# Patient Record
Sex: MALE | Race: WHITE | NOT HISPANIC OR LATINO | Employment: FULL TIME | URBAN - METROPOLITAN AREA
[De-identification: names, ages, dates, MRNs, and addresses within clinical notes are randomized per-mention and may not be internally consistent; named-entity substitution may affect disease eponyms.]

---

## 2023-07-27 ENCOUNTER — OFFICE VISIT (OUTPATIENT)
Dept: URGENT CARE | Facility: URGENT CARE | Age: 38
End: 2023-07-27
Payer: COMMERCIAL

## 2023-07-27 VITALS
WEIGHT: 215 LBS | RESPIRATION RATE: 20 BRPM | HEART RATE: 117 BPM | DIASTOLIC BLOOD PRESSURE: 88 MMHG | TEMPERATURE: 100.9 F | OXYGEN SATURATION: 98 % | SYSTOLIC BLOOD PRESSURE: 133 MMHG

## 2023-07-27 DIAGNOSIS — R50.9 FEVER IN ADULT: ICD-10-CM

## 2023-07-27 DIAGNOSIS — Z20.822 SUSPECTED 2019 NOVEL CORONAVIRUS INFECTION: Primary | ICD-10-CM

## 2023-07-27 DIAGNOSIS — R11.2 NAUSEA AND VOMITING, UNSPECIFIED VOMITING TYPE: ICD-10-CM

## 2023-07-27 LAB — DEPRECATED S PYO AG THROAT QL EIA: NEGATIVE

## 2023-07-27 PROCEDURE — 99203 OFFICE O/P NEW LOW 30 MIN: CPT | Performed by: NURSE PRACTITIONER

## 2023-07-27 PROCEDURE — 87651 STREP A DNA AMP PROBE: CPT | Performed by: NURSE PRACTITIONER

## 2023-07-27 PROCEDURE — 87635 SARS-COV-2 COVID-19 AMP PRB: CPT | Performed by: NURSE PRACTITIONER

## 2023-07-27 RX ORDER — ONDANSETRON 4 MG/1
4 TABLET, ORALLY DISINTEGRATING ORAL EVERY 8 HOURS PRN
Qty: 21 TABLET | Refills: 0 | Status: SHIPPED | OUTPATIENT
Start: 2023-07-27 | End: 2023-08-03

## 2023-07-27 RX ORDER — ONDANSETRON 4 MG/1
4 TABLET, ORALLY DISINTEGRATING ORAL ONCE
Status: COMPLETED | OUTPATIENT
Start: 2023-07-27 | End: 2023-07-27

## 2023-07-27 RX ORDER — AMLODIPINE BESYLATE 5 MG/1
TABLET ORAL
COMMUNITY
Start: 2023-04-14

## 2023-07-27 RX ORDER — IBUPROFEN 400 MG/1
400 TABLET, FILM COATED ORAL EVERY 6 HOURS PRN
COMMUNITY
End: 2023-08-04

## 2023-07-27 RX ADMIN — ONDANSETRON 4 MG: 4 TABLET, ORALLY DISINTEGRATING ORAL at 19:39

## 2023-07-28 LAB
GROUP A STREP BY PCR: NOT DETECTED
SARS-COV-2 RNA RESP QL NAA+PROBE: NEGATIVE

## 2023-07-28 NOTE — PATIENT INSTRUCTIONS
You may want to stick with Tylenol for fever control as ibuprofen is hard on her stomach.      Vomiting and diarrhea can have many causes, including:  Helping your body get rid of harmful substances   Gastroenteritis caused by viruses, parasites, bacteria, or toxins.  Allergy to or side effect of a food or medicine  Severe stress or worry (anxiety)   Other illnesses  Pregnancy  It is often hard to pinpoint an exact cause, even with testing. Vomiting and diarrhea often go away within a day or two without problems. If they continue, though, they can lead to too much loss of fluid (dehydration). This can be serious if not treated.    Medicines  You may use acetaminophen or NSAID medicines like ibuprofen or naproxen to control fever, unless another medicine was prescribed. If you have chronic liver or kidney disease, talk with your healthcare provider before using these medicines. Also talk with your provider if you've had a stomach ulcer or gastrointestinal bleeding. Don't give aspirin to anyone under 18 years of age who is ill with a fever because it may cause severe disease or death. Don't use NSAID medicines if you are already taking one for another condition (like arthritis) or are on aspirin (such as for heart disease or after a stroke)  Over-the-counter medicines for diarrhea, nausea, and vomiting are generally OK unless you have bleeding, fever, or severe abdominal pain.  General care  If symptoms are severe, rest at home for the next 24 hours, or until you are feeling better.  Washing your hands with soap and water, or using alcohol-based hand  is the best way to stop the spread of infection. Wash your hands after touching anyone who is sick.  Wash your hands after using the toilet and before meals. Clean the toilet after each use.  Dry your hands with a single use towel.  Caffeine, tobacco, and alcohol can make the diarrhea, cramping, and pain worse. Remember, caffeine not only is in coffee, but also  is in chocolate, some energy drinks, and teas.  Diet  Water and clear liquids are important so you don't get dehydrated. Drink a small amount at a time. Don't guzzle down the drinks. That may increase your nausea, make cramping worse, and cause the drinks to come back up.  Sports drinks may also help if you are healthy and not too dehydrated. They have too much sugar and not enough electrolytes and can sometimes make things worse. Also, don't drink beverages that are too acidic, like orange juice and grape juice.  If you are very dehydrated, commercially available products called oral rehydration solutions are best.  Food  Don't force yourself to eat, especially if you have cramps, diarrhea, or vomiting. Eat just a little at a time, and then wait a few minutes before you try to eat more.  Don't eat fatty, greasy, spicy, or fried foods.  Don't eat dairy products if you have diarrhea. They can make it worse.  During the first 24 hours (the first full day), follow the diet below:  Beverages: Oral rehydration solutions, sports drinks, soft drinks without caffeine, mineral water, and decaffeinated tea and coffee  Soups: Clear broth, consommé, and bouillon  Desserts: Plain gelatin, popsicles, and fruit juice bars  During the next 24 hours (the second day), you may add the following to the above if you are better. If not, continue what you did the first day:  Hot cereal, plain toast, bread, rolls, crackers  Plain noodles, rice, mashed potatoes, chicken noodle or rice soup  Unsweetened canned fruit (avoid pineapple), bananas  Limit fat intake to less than 15 grams per day by avoiding margarine, butter, oils, mayonnaise, sauces, gravies, fried foods, peanut butter, meat, poultry, and fish.  Limit fiber. Avoid raw or cooked vegetables, fresh fruits (except bananas) and bran cereals.  Limit caffeine and chocolate. No spices or seasonings except salt.  During the next 24 hours:  Gradually resume a normal diet, as you feel  better and your symptoms improve.  If at any time your symptoms start getting worse again, go back to clear liquids until you feel better.  Food preparation  If you have diarrhea, you should not prepare food for others. When preparing foods, wash your hands before and after.  Wash your hands or use alcohol-based  after using cutting boards, countertops, and knives that have been in contact with raw food.  Dry your hands with a single use towel.  Keep uncooked meats away from cooked and ready-to-eat foods.    Follow-up care  Follow up with your healthcare provider, or as advised. Call if you don't get better in the next 2 to 3 days. If a stool (diarrhea) sample was taken, or cultures done, you will be told if they are positive, or if your treatment needs to be changed. You may call as directed for the results.  If X-rays were taken, you will be notified of any new findings that may affect your care  Call 911  Call 911 if any of these occur:  Trouble breathing  Chest pain  Confusion  Severe drowsiness or trouble awakening  Fainting or loss of consciousness  Rapid heart rate  Seizure  Stiff neck  Severe weakness, dizziness, or lightheadedness  When to seek medical advice  Call your healthcare provider right away if any of these occur:  Bloody or black vomit or stools  Severe, steady abdominal pain or any abdominal pain that is getting worse  Severe headache or stiff neck  An inability to hold down even sips of liquids for more than 12 hours  Vomiting that lasts more than 24 hours  Diarrhea that lasts more than 24 hours  Fever of 100.4 F (38.0 C) or higher, or as directed by your healthcare provider  Yellowish color to your skin or the whites of your eyes  Signs of dehydration, such as dry mouth, little urine (less than every 6 hours), or very dark urine

## 2023-07-28 NOTE — PROGRESS NOTES
Chief Complaint   Patient presents with    Urgent Care     Chills and mild fever 100.9 for the last 5 hours, son was diagnose with HFM         ICD-10-CM    1. Suspected 2019 novel coronavirus infection  Z20.822 Symptomatic COVID-19 Virus (Coronavirus) by PCR Nasopharyngeal      2. Nausea and vomiting, unspecified vomiting type  R11.2 ondansetron (ZOFRAN ODT) ODT tab 4 mg     Streptococcus A Rapid Screen w/Reflex to PCR - Clinic Collect     Group A Streptococcus PCR Throat Swab     ondansetron (ZOFRAN ODT) 4 MG ODT tab      3. Fever in adult  R50.9 Streptococcus A Rapid Screen w/Reflex to PCR - Clinic Collect     Group A Streptococcus PCR Throat Swab      Ondansetron, Tylenol for fever.  Likely hand-foot-and-mouth from exposure by child.  If he develops other concerning symptoms he is instructed to return for recheck.      Results for orders placed or performed in visit on 07/27/23 (from the past 24 hour(s))   Streptococcus A Rapid Screen w/Reflex to PCR - Clinic Collect    Specimen: Throat; Swab   Result Value Ref Range    Group A Strep antigen Negative Negative       Subjective     Ry Puckett is an 37 year old male who presents to clinic today for fever , chills, nausea and vomiting, slight sore throat.     Ibuprofen at 1845.    ROS: 10 point ROS neg other than the symptoms noted above in the HPI.       Objective    /88   Pulse 117   Temp (!) 100.9  F (38.3  C)   Resp 20   Wt 97.5 kg (215 lb)   SpO2 98%   Nurses notes and VS have been reviewed.    Physical Exam       GENERAL APPEARANCE: alert and moderate distress, actively vomiting     EYES: PERRL, EOMI, sclera non-icteric     HENT: ear canals and TM's normal and multiple small ulcerations in the back of throat and on hard palate viewing into the buccal surfaces, surrounded by red bases     NECK: no adenopathy or asymmetry, thyroid normal to palpation     RESP: lungs clear to auscultation - no rales, rhonchi or wheezes     CV: regular rates and  rhythm, no murmurs, rubs, or gallop     ABDOMEN:  soft, nontender, no HSM or masses and bowel sounds normal     MS: extremities normal- no gross deformities noted; normal muscle tone.     SKIN: no suspicious lesions or rashes     NEURO: Normal strength and tone, mentation intact and speech normal     PSYCH: normal thought process; no significant mood disturbance      YONG Wilkerson, CNP  [facility] Provider    The use of Dragon/[employer]ware[employer] dictation services may have been used to construct the content in this note; any grammatical or spelling errors are non-intentional. Please contact the author of this note directly if you are in need of any clarification.

## 2023-08-04 ENCOUNTER — VIRTUAL VISIT (OUTPATIENT)
Dept: UROLOGY | Facility: CLINIC | Age: 38
End: 2023-08-04
Attending: UROLOGY
Payer: COMMERCIAL

## 2023-08-04 DIAGNOSIS — Z30.09 VASECTOMY EVALUATION: Primary | ICD-10-CM

## 2023-08-04 PROCEDURE — 99203 OFFICE O/P NEW LOW 30 MIN: CPT | Mod: VID | Performed by: UROLOGY

## 2023-08-04 NOTE — PROGRESS NOTES
Orlando is a 37 year old who is being evaluated via a billable video visit.      How would you like to obtain your AVS? MyChart  If the video visit is dropped, the invitation should be resent by: Text to cell phone: [phone]  Will anyone else be joining your video visit? No              Subjective   Orlando is a 37 year old, presenting for the following health issues:  Video Visit    HPI     Patient is interested in vasectomy.  He has 3 kids.      Review of Systems   Constitutional, HEENT, cardiovascular, pulmonary, gi and gu systems are negative, except as otherwise noted.      Objective           Vitals:  No vitals were obtained today due to virtual visit.    Physical Exam   GENERAL: Healthy, alert and no distress  EYES: Eyes grossly normal to inspection.  No discharge or erythema, or obvious scleral/conjunctival abnormalities.  RESP: No audible wheeze, cough, or visible cyanosis.  No visible retractions or increased work of breathing.    SKIN: Visible skin clear. No significant rash, abnormal pigmentation or lesions.  NEURO: Cranial nerves grossly intact.  Mentation and speech appropriate for age.  PSYCH: Mentation appears normal, affect normal/bright, judgement and insight intact, normal speech and appearance well-groomed.        Discussed    That vasectomy is permanent method of birth control.    That vasectomy can fail due to recanalization of the vas even many months/years later.    That he needs 2 negative sperm checks to be considered sterile    That there are other methods that are not permanent and also that the sperm can be frozen for later use.    How the technique is performed, risks of infection, bleeding, damage to the testes vessels and testes atrophy    Long term complication such as chronic and difficult to treat testes pain and questionable increase incidence of prostate cancer    That the procedure can be done at the clinic or hospital OR        Plan:    Stop Aspirin  Will schedule Vasectomy in the  future        Video-Visit Details    Type of service:  Video Visit     Originating Location (pt. Location): Home    Distant Location (provider location):  On-site  Platform used for Video Visit: [employer]

## 2023-08-08 ENCOUNTER — TELEPHONE (OUTPATIENT)
Dept: UROLOGY | Facility: CLINIC | Age: 38
End: 2023-08-08
Payer: COMMERCIAL

## 2023-08-08 NOTE — TELEPHONE ENCOUNTER
Patient calling office to schedule vasectomy.  Appointment scheduled 9/25/23. Patient verbally instructed.  Teodora Glover CMA

## 2023-08-13 ENCOUNTER — HEALTH MAINTENANCE LETTER (OUTPATIENT)
Age: 38
End: 2023-08-13

## 2023-09-15 ENCOUNTER — TELEPHONE (OUTPATIENT)
Dept: UROLOGY | Facility: CLINIC | Age: 38
End: 2023-09-15
Payer: COMMERCIAL

## 2023-09-15 NOTE — TELEPHONE ENCOUNTER
[facility]    Phone Message    May a detailed message be left on voicemail: no     Reason for Call: Other: Patient called to cancel her appointment on 9/25 for a vasectomy due to provider and clinic not being in network any more. Please call patient if there is any follow up.     Action Taken: Other: [facility]    Travel Screening: Not Applicable

## 2023-11-13 ENCOUNTER — VIRTUAL VISIT (OUTPATIENT)
Dept: FAMILY MEDICINE | Facility: CLINIC | Age: 38
End: 2023-11-13
Payer: COMMERCIAL

## 2023-11-13 DIAGNOSIS — J06.9 UPPER RESPIRATORY TRACT INFECTION, UNSPECIFIED TYPE: Primary | ICD-10-CM

## 2023-11-13 PROCEDURE — 99213 OFFICE O/P EST LOW 20 MIN: CPT | Mod: 95 | Performed by: FAMILY MEDICINE

## 2023-11-13 NOTE — PROGRESS NOTES
Orlando is a 37 year old who is being evaluated via a billable video visit.      How would you like to obtain your AVS? MyChart  If the video visit is dropped, the invitation should be resent by: Text to cell phone: [phone]  Will anyone else be joining your video visit? No        Assessment & Plan     Upper respiratory tract infection, unspecified type  10 day hx of uri with chest congestion/yellow green sputum. Today feeling better. Rec mucinex 600-1200 mg bid, fluids, rest. Cxr if persistent/worsening sx's. He is traveling to [place] today and can also mychart if sx's slightly worsen when he is there for abx. However, if sx's worsen significantly should be seen in person.   - XR Chest 2 Views; Future             Noreen Thorpe MD  [facility]   Orlando is a 37 year old, presenting for the following health issues:  Cough        11/13/2023     7:17 AM   Additional Questions   Roomed by jay   Accompanied by self       History of Present Illness       Reason for visit:  I think i have a respiratory infection a week after developing a cough. I sometimes cough up yellow/green mucus a week after developing a bad cough.  Symptom onset:  1-2 weeks ago  Symptoms include:  Cough from my chest, yellow/mucus coming up.  Symptom intensity:  Moderate  Symptom progression:  Worsening  Had these symptoms before:  No  What makes it worse:  Bending over (lowering my head) triggers a coughing fit    He eats 2-3 servings of fruits and vegetables daily.He consumes 0 sweetened beverage(s) daily.He exercises with enough effort to increase his heart rate 9 or less minutes per day.  He exercises with enough effort to increase his heart rate 3 or less days per week. He is missing 1 dose(s) of medications per week.  He is not taking prescribed medications regularly due to remembering to take.    Pt say he has had a cough deep in his chest for a week. It s progressed from a dry, itchy cough  starting near the base of wind pipe into to a wetter cough deeper in his chest / closer to his lungs. Sometimes he is coughing up yellow/green mucus      Pt says it came without any cold symptoms 10 days ago.     Sxstarted with dry itchy tickle deep in throat/neck area. Cough on exhale  Covid test was neg  Wife and kids all sick    Thurs or Friday last week started to transition to wet cough originating from sternum area.   Worse over weekend  Felt little better today.     Occ yellow green mucous  No fever,   General fatigue.   Mild congestion like normal     Ibuprofen last week, cough drops, zinc tabs    Functioning okay  Eating well.     No dyspnea/chest pain.   Today able to take deep breaths and thinks sleeping upright helps.     No hx of asthma or needed inhaler.         Review of Systems         Objective           Vitals:  No vitals were obtained today due to virtual visit.    Physical Exam   GENERAL: Healthy, alert and no distress  EYES: Eyes grossly normal to inspection.  No discharge or erythema, or obvious scleral/conjunctival abnormalities.  RESP: No audible wheeze, or visible cyanosis.  Rare cough. No visible retractions or increased work of breathing.    SKIN: Visible skin clear. No significant rash, abnormal pigmentation or lesions.  NEURO: Cranial nerves grossly intact.  Mentation and speech appropriate for age.  PSYCH: Mentation appears normal, affect normal/bright, judgement and insight intact, normal speech and appearance well-groomed.                Video-Visit Details    Type of service:  Video Visit     Originating Location (pt. Location): Home    Distant Location (provider location):  Off-site  Platform used for Video Visit: [employer]

## 2023-11-13 NOTE — PATIENT INSTRUCTIONS
Mucinex 600-1200 mg twice daily as needed for congestion.     Be seen in person if difficulty breathing, new fever, etc.

## 2023-11-17 ENCOUNTER — MYC MEDICAL ADVICE (OUTPATIENT)
Dept: FAMILY MEDICINE | Facility: CLINIC | Age: 38
End: 2023-11-17

## 2023-11-17 ENCOUNTER — ANCILLARY PROCEDURE (OUTPATIENT)
Dept: GENERAL RADIOLOGY | Facility: CLINIC | Age: 38
End: 2023-11-17
Attending: FAMILY MEDICINE
Payer: COMMERCIAL

## 2023-11-17 DIAGNOSIS — J06.9 UPPER RESPIRATORY TRACT INFECTION, UNSPECIFIED TYPE: Primary | ICD-10-CM

## 2023-11-17 DIAGNOSIS — J06.9 UPPER RESPIRATORY TRACT INFECTION, UNSPECIFIED TYPE: ICD-10-CM

## 2023-11-17 DIAGNOSIS — R05.9 COUGH, UNSPECIFIED TYPE: ICD-10-CM

## 2023-11-17 PROCEDURE — 71046 X-RAY EXAM CHEST 2 VIEWS: CPT | Mod: TC | Performed by: RADIOLOGY

## 2023-11-17 RX ORDER — ALBUTEROL SULFATE 90 UG/1
2 AEROSOL, METERED RESPIRATORY (INHALATION) EVERY 4 HOURS PRN
Qty: 18 G | Refills: 0 | Status: SHIPPED | OUTPATIENT
Start: 2023-11-17 | End: 2024-07-16

## 2023-11-17 RX ORDER — BENZONATATE 100 MG/1
100 CAPSULE ORAL 3 TIMES DAILY PRN
Qty: 30 CAPSULE | Refills: 0 | Status: SHIPPED | OUTPATIENT
Start: 2023-11-17 | End: 2024-07-16

## 2023-11-17 NOTE — RESULT ENCOUNTER NOTE
Orlando,  Your x-ray was reviewed by radiology.  No pneumonia or other abnormalities were seen.  Please continue with the plan per my message.  Kind regards,  Noreen Thorpe MD

## 2023-11-27 ENCOUNTER — OFFICE VISIT (OUTPATIENT)
Dept: FAMILY MEDICINE | Facility: CLINIC | Age: 38
End: 2023-11-27
Payer: COMMERCIAL

## 2023-11-27 VITALS
HEART RATE: 80 BPM | HEIGHT: 73 IN | TEMPERATURE: 97.2 F | DIASTOLIC BLOOD PRESSURE: 96 MMHG | WEIGHT: 207.25 LBS | OXYGEN SATURATION: 100 % | BODY MASS INDEX: 27.47 KG/M2 | SYSTOLIC BLOOD PRESSURE: 148 MMHG

## 2023-11-27 DIAGNOSIS — I10 PRIMARY HYPERTENSION: ICD-10-CM

## 2023-11-27 DIAGNOSIS — F43.23 ADJUSTMENT DISORDER WITH MIXED ANXIETY AND DEPRESSED MOOD: ICD-10-CM

## 2023-11-27 DIAGNOSIS — R05.9 COUGH, UNSPECIFIED TYPE: Primary | ICD-10-CM

## 2023-11-27 RX ORDER — PREDNISONE 20 MG/1
40 TABLET ORAL DAILY
Qty: 10 TABLET | Refills: 0 | Status: SHIPPED | OUTPATIENT
Start: 2023-11-27 | End: 2024-07-16

## 2023-11-27 RX ORDER — CODEINE PHOSPHATE AND GUAIFENESIN 10; 100 MG/5ML; MG/5ML
1-2 SOLUTION ORAL EVERY 4 HOURS PRN
Qty: 180 ML | Refills: 0 | Status: SHIPPED | OUTPATIENT
Start: 2023-11-27 | End: 2024-07-16

## 2023-11-27 NOTE — Clinical Note
Arnoldo Paz, Met with Orlando today. Really nice tawnya with a lot on his plate. I had suggested meeting with you before and he is now interested. Can you give him a call, please? Let me know if you have questions. Thanks, Bernabe

## 2023-11-27 NOTE — PROGRESS NOTES
"  Assessment & Plan   Problem List Items Addressed This Visit          Circulatory    Primary hypertension     Other Visit Diagnoses       Cough, unspecified type    -  Primary    Relevant Medications    predniSONE (DELTASONE) 20 MG tablet    guaiFENesin-codeine (ROBITUSSIN AC) 100-10 MG/5ML solution    Adjustment disorder with mixed anxiety and depressed mood              Suspect Orlando is no longer infectious at this point, but dealing with persistent post infectious inflammation. Rxs as noted below to help with sleep and reduce inflammation. Encouraged Orlando to contact me with any questions.     No plans to change BP meds at this point. Encouraged Orlando to check pressures at home intermittently when he has a chance to relax. Could consider med change if home BPs remain elevated.    Will forward chart to clinic S to contact Orlando at Jackson's convenience. Briefly touched on medication options but Orlando defers this today.      32 minutes spent on the date of the encounter doing chart review, history and exam, documentation and further activities as noted.      Bernabe Moore MD  [facility]    Subjective   Orlando is a 37 year old, presenting for the following health issues:  Cold Symptoms (Has done a number of virtual visits for a chest infection prior to this appointment. 2nd round of medications helped sx for a while, but got a stomach bug over Thanksgiving. Cough has gotten worse, mucus is more watery, brownish-yellow. Tested COVID in the first week - negative.)      HPI   New Patient  Acute Issue  \"Cold symptoms for several weeks\"  - previous CXR of 11/17/23 was  normal  - previous visit recommended tessalon and possible Albuterol inhaler PRN  - albuterol not really helping  - tessalon helped a little but less so recently  - mostly it's the cough  - denies fever, chills, nausea, body aches    Mental Health  - multiple life stressors recently  - moved  - wife lost job then got a new job  - persistent URI " "symptoms  - new twins  - would like to meet with a counselor to talk about how to navigate all of this    BP  - takes 5mg of amlodipine  - BP elevated in clinic today  - suspects this has to do with the chronic URI symptoms and the multiple life stressors noted above  - does have a home cuff, it can be hard to find a down time when the twins are asleep when he can check a baseline pressure      Review of Systems   Constitutional, HEENT, cardiovascular, pulmonary, gi and gu systems are negative, except as otherwise noted.      Objective    BP (!) 148/96   Pulse 80   Temp 97.2  F (36.2  C) (Skin)   Ht 1.861 m (6' 1.25\")   Wt 94 kg (207 lb 4 oz)   SpO2 100%   BMI 27.16 kg/m    Body mass index is 27.16 kg/m .  Physical Exam   GENERAL: healthy, alert and no distress  NECK: no adenopathy, no asymmetry, masses, or scars and thyroid normal to palpation  RESP: Cough. Lungs clear to auscultation - no rales, rhonchi or wheezes  CV: regular rate and rhythm, normal S1 S2, no S3 or S4, no murmur, click or rub, no peripheral edema and peripheral pulses strong  ABDOMEN: soft, nontender, no hepatosplenomegaly, no masses and bowel sounds normal  MS: no gross musculoskeletal defects noted, no edema                "

## 2023-11-27 NOTE — NURSING NOTE
"Orlando  37 year old    Chief Complaint   Patient presents with    Cold Symptoms     Has done a number of virtual visits for a chest infection prior to this appointment. 2nd round of medications helped sx for a while, but got a stomach bug over Thanksgiving. Cough has gotten worse, mucus is more watery, brownish-yellow. Tested COVID in the first week - negative.            Blood pressure (!) 144/98, pulse 80, temperature 97.2  F (36.2  C), temperature source Skin, height 1.861 m (6' 1.25\"), weight 94 kg (207 lb 4 oz), SpO2 100%. Body mass index is 27.16 kg/m .  There is no problem list on file for this patient.             Wt Readings from Last 2 Encounters:   11/27/23 94 kg (207 lb 4 oz)   07/27/23 97.5 kg (215 lb)       BP Readings from Last 3 Encounters:   11/27/23 (!) 144/98   07/27/23 133/88                Current Outpatient Medications   Medication    albuterol (PROAIR HFA/PROVENTIL HFA/VENTOLIN HFA) 108 (90 Base) MCG/ACT inhaler    amLODIPine (NORVASC) 5 MG tablet    benzonatate (TESSALON) 100 MG capsule    dextromethorphan-guaiFENesin (MUCINEX DM)  MG per 12 hr tablet     No current facility-administered medications for this visit.              Social History     Tobacco Use    Smoking status: Never    Smokeless tobacco: Never   Vaping Use    Vaping Use: Never used              Health Maintenance Due   Topic Date Due    YEARLY PREVENTIVE VISIT  Never done    ADVANCE CARE PLANNING  Never done    HEPATITIS B IMMUNIZATION (1 of 3 - 3-dose series) Never done    COVID-19 Vaccine (1) Never done    HIV SCREENING  Never done    HEPATITIS C SCREENING  Never done    DTAP/TDAP/TD IMMUNIZATION (1 - Tdap) Never done    LIPID  Never done    INFLUENZA VACCINE (1) Never done            No results found for: \"PAP\"           November 27, 2023 1:34 PM    "

## 2023-11-29 ENCOUNTER — TELEPHONE (OUTPATIENT)
Dept: BEHAVIORAL HEALTH | Facility: CLINIC | Age: 38
End: 2023-11-29

## 2023-11-29 NOTE — TELEPHONE ENCOUNTER
"[facility] rec'd referral for pt from PCP, requesting [facility] contact pt to educate about services, answer pt questions, and assist with scheduling if needed.     [facility] contacted and spoke directly with pt, explained the role of the [facility], answered pt questions,  Pt reported having \"three children under three\" and recently moved to area, and is experiencing increased anxiety and worry.  Pt expressed interest in [facility] services and scheduled [facility] appt on 12/20/23.    Shawn Ullrich LICSW, [facility]  Behavioral Health Clinician  [facility]       "

## 2023-12-19 ASSESSMENT — ANXIETY QUESTIONNAIRES
6. BECOMING EASILY ANNOYED OR IRRITABLE: MORE THAN HALF THE DAYS
7. FEELING AFRAID AS IF SOMETHING AWFUL MIGHT HAPPEN: MORE THAN HALF THE DAYS
GAD7 TOTAL SCORE: 11
1. FEELING NERVOUS, ANXIOUS, OR ON EDGE: MORE THAN HALF THE DAYS
5. BEING SO RESTLESS THAT IT IS HARD TO SIT STILL: SEVERAL DAYS
GAD7 TOTAL SCORE: 11
2. NOT BEING ABLE TO STOP OR CONTROL WORRYING: SEVERAL DAYS
3. WORRYING TOO MUCH ABOUT DIFFERENT THINGS: MORE THAN HALF THE DAYS
IF YOU CHECKED OFF ANY PROBLEMS ON THIS QUESTIONNAIRE, HOW DIFFICULT HAVE THESE PROBLEMS MADE IT FOR YOU TO DO YOUR WORK, TAKE CARE OF THINGS AT HOME, OR GET ALONG WITH OTHER PEOPLE: SOMEWHAT DIFFICULT
4. TROUBLE RELAXING: SEVERAL DAYS

## 2023-12-19 ASSESSMENT — PATIENT HEALTH QUESTIONNAIRE - PHQ9
10. IF YOU CHECKED OFF ANY PROBLEMS, HOW DIFFICULT HAVE THESE PROBLEMS MADE IT FOR YOU TO DO YOUR WORK, TAKE CARE OF THINGS AT HOME, OR GET ALONG WITH OTHER PEOPLE: SOMEWHAT DIFFICULT
SUM OF ALL RESPONSES TO PHQ QUESTIONS 1-9: 3
SUM OF ALL RESPONSES TO PHQ QUESTIONS 1-9: 3

## 2023-12-20 ENCOUNTER — OFFICE VISIT (OUTPATIENT)
Dept: BEHAVIORAL HEALTH | Facility: CLINIC | Age: 38
End: 2023-12-20
Payer: COMMERCIAL

## 2023-12-20 DIAGNOSIS — F41.1 GAD (GENERALIZED ANXIETY DISORDER): Primary | ICD-10-CM

## 2023-12-20 ASSESSMENT — COLUMBIA-SUICIDE SEVERITY RATING SCALE - C-SSRS
TOTAL  NUMBER OF ABORTED OR SELF INTERRUPTED ATTEMPTS LIFETIME: NO
2. HAVE YOU ACTUALLY HAD ANY THOUGHTS OF KILLING YOURSELF?: NO
ATTEMPT LIFETIME: NO
TOTAL  NUMBER OF INTERRUPTED ATTEMPTS LIFETIME: NO
6. HAVE YOU EVER DONE ANYTHING, STARTED TO DO ANYTHING, OR PREPARED TO DO ANYTHING TO END YOUR LIFE?: NO
1. HAVE YOU WISHED YOU WERE DEAD OR WISHED YOU COULD GO TO SLEEP AND NOT WAKE UP?: NO

## 2023-12-20 NOTE — PROGRESS NOTES
"    [facility]      PATIENT'S NAME: Ry Puckett  PREFERRED NAME: [place]  PRONOUNS:  he/him     MRN: [id number]  : 1985  ADDRESS: [address]  [facility] [address]  ACCT. NUMBER:  [id number]  DATE OF SERVICE: 23  START TIME: 2:10 pm  END TIME: 3:13 pm  PREFERRED PHONE: [phone]  May we leave a program related message: Yes  EMERGENCY CONTACT: was not obtained  .  SERVICE MODALITY:  In-person    [place] ADULT Mental Health DIAGNOSTIC ASSESSMENT    Identifying Information:  Patient is a 38 year old,   individual.  [facility]  Patient attended the session alone.    Chief Complaint:   The reason for seeking services at this time is: \"Coping techniques for when I find myself worrying.\".  The problem(s) began 23.  Patient has not attempted to resolve these concerns in the past.    Orlando stated, \"I find myself constantly feeling like life is going well and the other shoe is going to fall\".  Orlando reported his worries typically revolve around his health or the health of his children (2 y.o. son and 8 month old twins), including worries about experiencing an early death or \"a health catastrophe in my household\".  Orlando reported his wife \"recognizes I can get overwhelmed and short with people\".  Orlando reported the excessive worry started approximately 5 years ago when he was diagnosed with high blood pressure, but it increased this Fall () when he was ill and his Apple watch reported changes in his health indicators/readings, explaining this exacerbated his already present health worries.  Orlando endorsed the following: worry, rumination, fearing the worst, heart palpitations, muscle tension and teeth clenching/grinding, waking in the night and experiencing heart pounding and worry thoughts, irritability, psychomotor agitation/restlessness, fatigue and difficulty concentration. " "     Orlando reported brief past engagement with therapy services provided via work, but denied any other lifetime mental health services .  Orlando denied past psychotropic medication trials.  Orlando denied past SI, SIB, SA or psychiatric hospitalizations; denied safety concerns and was future oriented.  Orlando denied past problematic alcohol and/or substance use.      Social/Family History:  Patient reported they grew up in other [place], [place].  They were raised by biological parents  .  Parents were always together.  Patient reported that their childhood was \"everything was good (within the home)\", navigating the outside world was \"a little difficult\", \"complex\".  Patient described their current relationships with family of origin as: gets along well with parents.     Family:  Father was a hansen--broke his back when pt was in late elementary; father is hard working and perfectionist; empathetic and loving; well read and opinionated   Mother: Orlando speaks with mother weekly-good relationship    Wife: supportive; works in [employer]  Vishal: 2 y rs, 8 months  Ronda: 8 months  Jasmeet: 8 months    Orlando and Wife were residing in [place] prior to moving to [place] in June 2023  -decided to move to [place] to be closer to wife's parents (neither wife, nor parents are originally from [place])    College:  -[employer]  -rowing team    Coached Rowing for 10 years  -[employer] [facility]  -[employer]     The patient describes their cultural background as .  Cultural influences and impact on patient's life structure, values, norms, and healthcare: I went to middle school and high school in mostly black schools. I put a lot of effort into trying to fit in so I wouldn t stand out. As I ve grown older I ve felt really disappointed that I struggled to be confident in my own identity during those years. I feel that it negatively impacted many of the years that followed..  Contextual influences on patient's health " include: Contextual Factors: Individual Factors high blood pressure, recent illness .    These factors will be addressed in the Preliminary Treatment plan. Patient identified their preferred language to be English. Patient reported they does not need the assistance of an  or other support involved in therapy.     Patient reported had no significant delays in developmental tasks.   Patient's highest education level was college graduate  .  Patient identified the following learning problems: none reported.  Modifications will not be used to assist communication in therapy.  Patient reports they are  able to understand written materials.    Patient reported the following relationship history : one marriage (current).  Patient's current relationship status is  for 5 years.   Patient identified their sexual orientation as heterosexual.  Patient reported having 3 child(mariaa). Patient identified partner; parents; spouse as part of their support system.  Patient identified the quality of these relationships as stable and meaningful,  .      Patient's current living/housing situation involves staying in own home/apartment.  The immediate members of family and household include June Puckett, 38,Partner/Wife and 3 children, and they report that housing is stable.    Patient is currently employed fulltime.  Patient reports their finances are obtained through employment; spouse. Patient does identify finances as a current stressor.      Patient reported that they have not been involved with the legal system.   . Patient does not report being under probation/ parole/ jurisdiction. They are not under any current court jurisdiction. .    Patient's Strengths and Limitations:  Patient identified the following strengths or resources that will help them succeed in treatment: commitment to health and well being, family support, insight, intelligence, motivation, and work ethic. Things that may interfere with the patient's  "success in treatment include: none identified.     Assessments:  The following assessments were completed by patient for this visit:  PHQ9:       12/19/2023    11:17 AM   PHQ-9 SCORE   PHQ-9 Total Score MyChart 3 (Minimal depression)   PHQ-9 Total Score 3     GAD7:       12/19/2023    11:18 AM   ADDY-7 SCORE   Total Score 11 (moderate anxiety)   Total Score 11     CAGE-AID:       12/19/2023    11:38 AM   CAGE-AID Total Score   Total Score 0   Total Score MyChart 0 (A total score of 2 or greater is considered clinically significant)     PROMIS 10-Global Health (only subscores and total score):       12/19/2023    11:38 AM   PROMIS-10 Scores Only   Global Mental Health Score 13   Global Physical Health Score 16   PROMIS TOTAL - SUBSCORES 29       Personal and Family Medical History:  Patient report a family history of mental health concerns; paternal uncle most likely depression; mother most likely anxiety--she's \"a worrier\".  Patient reports family history is not on file..       Patient does not report Mental Health Diagnosis or Treatment.        Patient has had a physical exam to rule out medical causes for current symptoms.  Date of last physical exam was within the past year. Client was encouraged to follow up with PCP if symptoms were to develop. The patient has a [place] Primary Care Provider, who is named Bernabe Moore.  Patient reports no current medical concerns.  Patient denies any issues with pain..   There are not significant appetite / nutritional concerns / weight changes.   Patient does not report a history of head injury / trauma / cognitive impairment.      Patient reports current meds as:  No currently prescribed psychotropic medication    Medication Adherence: NA      Patient Allergies:  No Known Allergies    Medical History:  No past medical history on file.      Current Mental Status Exam:   Appearance:  Appropriate    Eye Contact:  Good   Psychomotor:  Normal       Gait / station:  no " problem  Attitude / Demeanor: Cooperative   Speech      Rate / Production: Normal/ Responsive      Volume:  Normal  volume      Language:  intact  Mood:   Anxious   Affect:   Worrisome    Thought Content: Clear   Thought Process: Coherent       Associations: No loosening of associations  Insight:   Good   Judgment:  Intact   Orientation:  All  Attention/concentration: Fair    Substance Use:   Patient did report a family history of substance use concerns; paternal uncle is sober 30 yrs; see medical history section for details.  Patient has not received chemical dependency treatment in the past.  Patient has not ever been to detox.      Patient is not currently receiving any chemical dependency treatment.     5 drinks in the past 6 months  Cannabis;   Caffeine: 4-5 lattes per week          Substance History of use Age of first use Date of last use     Pattern and duration of use (include amounts and frequency)   Alcohol currently use   17 12/18/23 5 alcoholic beverages in the past 6 months; typically does not finish drink   Cannabis   used in the past 32 12/01/22 Previously taking an edible prior to bed to help with relaxation and sleep; has not used in one year     Amphetamines   never used     REPORTS SUBSTANCE USE: N/A   Cocaine/crack    never used       REPORTS SUBSTANCE USE: N/A   Hallucinogens never used         REPORTS SUBSTANCE USE: N/A   Inhalants never used         REPORTS SUBSTANCE USE: N/A   Heroin never used         REPORTS SUBSTANCE USE: N/A   Other Opiates never used     REPORTS SUBSTANCE USE: N/A   Benzodiazepine   never used     REPORTS SUBSTANCE USE: N/A   Barbiturates never used     REPORTS SUBSTANCE USE: N/A   Over the counter meds used in the past 38 12/01/23 REPORTS SUBSTANCE USE: N/A   Caffeine currently use 20   4-5 latte drinks per week and less than 1 cup coffee per day    Nicotine  never used     REPORTS SUBSTANCE USE: N/A   Other substances not listed above:  Identify:  never used      REPORTS SUBSTANCE USE: N/A     Patient reported the following problems as a result of their substance use: no problems, not applicable.    Substance Use: No symptoms    Based on the negative CAGE score and clinical interview there  are not indications of drug or alcohol abuse.    Significant Losses / Trauma / Abuse / Neglect Issues:   Patient did not  serve in the .  There are indications or report of significant loss, trauma, abuse or neglect issues related to: are no indications and client denies any losses, trauma, abuse, or neglect concerns.  Concerns for possible neglect are not present.     Safety Assessment:   Patient denies current homicidal ideation and behaviors.  Patient denies current self-injurious ideation and behaviors.    Patient denied risk behaviors associated with substance use.   Patient denies any high risk behaviors associated with mental health symptoms.  Patient reports the following current concerns for their personal safety: None.  Patient reports there are not firearms in the house.      History of Safety Concerns:  Patient denied a history of homicidal ideation.     Patient denied a history of personal safety concerns.    Patient denied a history of assaultive behaviors.    Patient denied a history of sexual assault behaviors.     Patient denied a history of risk behaviors associated with substance use.  Patient denies any history of high risk behaviors associated with mental health symptoms.  Patient reports the following protective factors: forward or future oriented thinking; dedication to family or friends; safe and stable environment; regular sleep; purpose; secure attachment; abstinence from substances; living with other people; daily obligations; structured day; effective problem solving skills; commitment to well being; sense of meaning; positive social skills; healthy fear of risky behaviors or pain; financial stability; strong sense of self worth or esteem; sense of  personal control or determination    Risk Plan:  See Recommendations for Safety and Risk Management Plan    Review of Symptoms per patient report:   Depression: Change in sleep  Abena:  No Symptoms  Psychosis: No Symptoms  Anxiety: Excessive worry, Nervousness, Physical complaints, such as headaches, stomachaches, muscle tension, Sleep disturbance, Psychomotor agitation, Ruminations, Poor concentration, and Irritability  Panic:  Palpitations  Post Traumatic Stress Disorder:  No Symptoms   Eating Disorder: No Symptoms  ADD / ADHD:  No symptoms  Conduct Disorder: No symptoms  Autism Spectrum Disorder: No symptoms  Obsessive Compulsive Disorder: No Symptoms    Patient reports the following compulsive behaviors and treatment history:  none .      Diagnostic Criteria:   Generalized Anxiety Disorder  A. Excessive anxiety and worry about a number of events or activities (such as work or school performance).   B. The person finds it difficult to control the worry.  C. Select 3 or more symptoms (required for diagnosis). Only one item is required in children.   - Restlessness or feeling keyed up or on edge.    - Being easily fatigued.    - Difficulty concentrating or mind going blank.    - Irritability.    - Muscle tension.    - Sleep disturbance (difficulty falling or staying asleep, or restless unsatisfying sleep).   D. The focus of the anxiety and worry is not confined to features of an Axis I disorder.  E. The anxiety, worry, or physical symptoms cause clinically significant distress or impairment in social, occupational, or other important areas of functioning.   F. The disturbance is not due to the direct physiological effects of a substance (e.g., a drug of abuse, a medication) or a general medical condition (e.g., hyperthyroidism) and does not occur exclusively during a Mood Disorder, a Psychotic Disorder, or a Pervasive Developmental Disorder.    - The aformentioned symptoms began 5 year(s) ago and occurs 7 days per  week and is experienced as moderate.    Functional Status:  Patient reports the following functional impairments:  childcare / parenting, health maintenance, management of the household and or completion of tasks, relationship(s), self-care, social interactions, and work / vocational responsibilities.     Nonprogrammatic care:  Patient is requesting basic services to address current mental health concerns.    Clinical Summary:  1. Psychosocial, Cultural and Contextual Factors: , , heterosexual, male; employed  .  2. Principal DSM5 Diagnoses  (Sustained by DSM5 Criteria Listed Above):   300.02 (F41.1) Generalized Anxiety Disorder.  3. Other Diagnoses that is relevant to services:   NA  4. Provisional Diagnosis:  NA  5. Prognosis: Expect Improvement, Relieve Acute Symptoms, and Maintain Current Status / Prevent Deterioration.  6. Likely consequences of symptoms if not treated: Continued/worsening symptoms and increased/prolonged functional impairment.  7. Client strengths include:  educated, empathetic, employed, goal-focused, insightful, intelligent, motivated, responsible parent, support of family, friends and providers, wants to learn, and work history .     Recommendations:     1. Plan for Safety and Risk Management:   Safety and Risk: Recommended that patient call 911 or go to the local ED should there be a change in any of these risk factors..          Report to child / adult protection services was NA.     2. Patient's identified mental health concerns with a cultural influence will be addressed by recognizing interaction between mind and body, using whole person wellness approach  and drawing on patients experience in athletics.     3. Initial Treatment will focus on:    Anxiety - psychoeducation about symptoms and warning signs, and development of coping skills .      4. Resources/Service Plan:    services are not indicated.   Modifications to assist communication are not  "indicated.   Additional disability accommodations are not indicated.      5. Collaboration:   Collaboration / coordination of treatment will be initiated with the following  support professionals: primary care physician.      6.  Referrals:   The following referral(s) will be initiated: [facility].       A Release of Information has been obtained for the following:  NA .     Clinical Substantiation/medical necessity for the above recommendations:  The reason for seeking services at this time is: \"Coping techniques for when I find myself worrying\".  Orlando stated, \"I find myself constantly feeling like life is going well and the other shoe is going to fall\".  Orlando reported his worries typically revolve around his health or the health of his children (2 y.o. son and 8 month old twins), including experiencing an early death or \"a health catastrophe in my household\".  Orlando reported his wife \"recognizes I can get overwhelmed and short with people\".  Orlando reported the excessive worry started approximately 5 years ago when he was diagnosed with high blood pressure, but it increased this Fall when he was sick and his Apple watch reported changes in his health indicators/readings.  Orlando endorsed the following: worry, rumination, fearing the worst, heart palpitations, muscle tension and teeth clenching/grinding, waking in the night and experiencing heart pounding and worry thoughts, irritability, psychomotor agitation/restlessness, fatigue and difficulty concentration.  Orlando reported brief past engagement with therapy services provided via work, but denied any other mental health services.  Orlando denied past psychotropic medication trials.  Orlando denied past SI, SIB, SA or psychiatric hospitalizations; denied safety concerns and was future oriented.  Orlando denied past problematic alcohol and/or substance use. Orlando currently met criteria for ADDY and recommending outpatient therapy services.  Orlando was receptive to  " addressing mental health symptoms through use outpatient psychotherapy services.  [facility] will refer to psychiatric medication evaluation if Orlando is not able to achieve desired results through psychotherapy services alone.     With Orlando's permission, [facility] educated him about anxiety, the brain, and the body, and how breathing is an effective intervention to address anxiety.  C modeled diaphragmatic breathing technique and recommended box breathing 2x's/day.  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      7. YAZAN:    YAZAN:  Discussed the general effects of drugs and alcohol on health and well-being. Provider educated patient about the effects of chemical use on their health and well being. Recommendations:  None .     8. Records:   These were reviewed at time of assessment.   Information in this assessment was obtained from the medical record and  provided by patient who is a good historian.    Patient will have open access to their mental health medical record.    9.   Interactive Complexity: No    10. Safety Plan:  Patient denied any current/recent/lifetime history of suicidal ideation and/or behaviors.  No safety plan indicated at this time.     Provider Name/ Credentials:  Shawn Ullrich LICSW, MARIELLA  December 20, 2023

## 2024-01-16 ASSESSMENT — ANXIETY QUESTIONNAIRES
GAD7 TOTAL SCORE: 1
8. IF YOU CHECKED OFF ANY PROBLEMS, HOW DIFFICULT HAVE THESE MADE IT FOR YOU TO DO YOUR WORK, TAKE CARE OF THINGS AT HOME, OR GET ALONG WITH OTHER PEOPLE?: NOT DIFFICULT AT ALL
2. NOT BEING ABLE TO STOP OR CONTROL WORRYING: NOT AT ALL
4. TROUBLE RELAXING: NOT AT ALL
5. BEING SO RESTLESS THAT IT IS HARD TO SIT STILL: NOT AT ALL
1. FEELING NERVOUS, ANXIOUS, OR ON EDGE: NOT AT ALL
GAD7 TOTAL SCORE: 1
7. FEELING AFRAID AS IF SOMETHING AWFUL MIGHT HAPPEN: NOT AT ALL
IF YOU CHECKED OFF ANY PROBLEMS ON THIS QUESTIONNAIRE, HOW DIFFICULT HAVE THESE PROBLEMS MADE IT FOR YOU TO DO YOUR WORK, TAKE CARE OF THINGS AT HOME, OR GET ALONG WITH OTHER PEOPLE: NOT DIFFICULT AT ALL
6. BECOMING EASILY ANNOYED OR IRRITABLE: SEVERAL DAYS
GAD7 TOTAL SCORE: 1
7. FEELING AFRAID AS IF SOMETHING AWFUL MIGHT HAPPEN: NOT AT ALL
3. WORRYING TOO MUCH ABOUT DIFFERENT THINGS: NOT AT ALL

## 2024-01-16 ASSESSMENT — PATIENT HEALTH QUESTIONNAIRE - PHQ9
SUM OF ALL RESPONSES TO PHQ QUESTIONS 1-9: 0
SUM OF ALL RESPONSES TO PHQ QUESTIONS 1-9: 0

## 2024-01-16 NOTE — PROGRESS NOTES
[facility]      PATIENT'S NAME: Ry Puckett  PREFERRED NAME: [place]  PRONOUNS:  he/him     MRN: [id number]  : 1985  [address] [facility] [address]:  [id number]  DATE OF SERVICE: 24  PREFERRED PHONE: [phone]  May we leave a program related message: Yes  EMERGENCY CONTACT: was not obtained  .  SERVICE MODALITY:  In-person      Behavioral Health Clinician Progress Note    Patient Name: Ry Puckett           Service Type:  Individual      Service Location:   Face to Face in Clinic     Session Start Time: 10:31 am  Session End Time: 10:58 am      Session Length: 16 - 37      Attendees: Patient     Service Modality:  In-person    Visit Activities (Refresh list every visit): [facility] Only    Diagnostic Assessment Date: 23  Treatment Plan Review Date: 24  CGI Review Date: 3/20/24  Promis 10 Review Date: 3/19/24      Clinical Global Impressions  First:  Considering your total clinical experience with this particular patient population, how severe are the patient's symptoms at this time?: 4 (2023  2:00 PM)    Most recent:  No data recorded      See Flowsheets for today's PHQ-9 and ADDY-7 results  Previous PHQ-9:       2023    11:17 AM 2024     8:36 PM   PHQ-9 SCORE   PHQ-9 Total Score MyChart 3 (Minimal depression) 0   PHQ-9 Total Score 3 0     Previous ADDY-7:       2023    11:18 AM 2024     8:37 PM   ADDY-7 SCORE   Total Score 11 (moderate anxiety) 1 (minimal anxiety)   Total Score 11 1       RUDI LEVEL:       No data to display                DATA  Extended Session (60+ minutes): No  Interactive Complexity: No  Crisis: No  [facility] Patient: No    Treatment Objective(s) Addressed in This Session:  Target Behavior(s):  exercise    Anxiety: will experience a reduction in anxiety, will develop more effective coping skills to manage anxiety symptoms, will develop healthy cognitive patterns and beliefs, and will  "increase ability to function adaptively    Current Stressors / Issues:  [facility] focused on change by showing Orlando the reduction of PHQ-9 and ADDY-7 scores since previous visit.  Orlando reported scores accurately reflect decrease in symptoms.  Orlando reported despite some \"stressful moments\" during the Holidays, he was able to manage the stress by effectively communicating with wife and taking deep breaths when feeling stressed.  Orlando stated \"I'm a little more in control\" than prior to the holidays.  Orlando also noted overall decrease in stress since the holidays, affirming November thru December was a more stressful time of year starting with his illness and health concerns, and then adding the holidays.     [facility] used MI interventions to assess motivation/stage of change, elicit change talk, prompt identification of identification of goals, and engage pt in treatment planning.  Orlando responded with change talk, stating \"The breathing exercises help a little\" and taking \"a deep breath in the moment\" also helps.  Orlando explained he knows doing things for himself works (eg breathing, deep breaths, walking during the day), therefore his goal is to \"make time\" for himself daily.  Orlando reported if he can make the time and use the time to  engage in a healthy activity daily, this will prevent the accumulation of stress and \"that point of dread\".  Orlando also noted he does best when he has a \"behavioral strategy\", so walking away from [facility] appts with a behavioral strategy or \"adjustment\" he can apply in his life will help him feel more confident.     [facility] reflected current demands of being father,  and work, and inquired about how to occur barriers of responsibilities and changing schedule.  Orlando acknowledged not every day will be the same, therefore he cannot have one time and one activity, but rather he will need to have several activities that fit his varying time.  Orlando reported he will commit to doing something daily, ranging from [place], " Yoga, walking, to stretching.  Orlando stated even if he only has 5-10 mins, its better to do something than nothing.   Orlando reported he will most likely be able to fit exercise in during the day.  Finally, Orlando reported if sets time aside for himself daily, it will most likely lead to less time to stay up late doing an activity (painting, tu, etc), going to bed earlier, and getting more rest.     Orlando will continue to communicate with wife, take deep breaths, and make time for himself daily.    Progress on Treatment Objective(s) / Homework:  New Objective established this session - PREPARATION (Decided to change - considering how); Intervened by negotiating a change plan and determining options / strategies for behavior change, identifying triggers, exploring social supports, and working towards setting a date to begin behavior change    Motivational Interviewing    MI Intervention: Co-Developed Goal: decrease anxiety, Expressed Empathy/Understanding, Supported Autonomy, Collaboration, Evocation, Open-ended questions, Reflections: simple and complex, and Change talk (evoked)     Change Talk Expressed by the Patient: Desire to change Ability to change Reasons to change Need to change Committment to change    Provider Response to Change Talk: E - Evoked more info from patient about behavior change, A - Affirmed patient's thoughts, decisions, or attempts at behavior change, R - Reflected patient's change talk, and S - Summarized patient's change talk statements    Also provided psychoeducation about behavioral health condition, symptoms, and treatment options        Care Plan review completed: Yes    Medication Review:  No current psychiatric medications prescribed    Medication Compliance:  NA    Changes in Health Issues:   None reported    Chemical Use Review:   Substance Use: Chemical use reviewed, no active concerns identified      Tobacco Use: No current tobacco use.      Assessment: Current Emotional / Mental  Status (status of significant symptoms):  Risk status (Self / Other harm or suicidal ideation)  Patient denies a history of suicidal ideation, suicide attempts, self-injurious behavior, homicidal ideation, homicidal behavior, and and other safety concerns  Patient denies current fears or concerns for personal safety.  Patient denies current or recent suicidal ideation or behaviors.  Patient denies current or recent homicidal ideation or behaviors.  Patient denies current or recent self injurious behavior or ideation.  Patient denies other safety concerns.  A safety and risk management plan has not been developed at this time, however patient was encouraged to call [place] [phone] should there be a change in any of these risk factors.    Appearance:   Appropriate   Eye Contact:   Good   Psychomotor Behavior: Normal   Attitude:   Cooperative   Orientation:   All  Speech   Rate / Production: Normal/ Responsive   Volume:  Normal   Mood:    Anxious   Affect:    Worrisome   Thought Content:  Clear   Thought Form:  Logical   Insight:    Good     Diagnoses:  1. ADDY (generalized anxiety disorder)        Collateral Reports Completed:  Routed note to PCP    Plan: (Homework, other):  Patient was given information about behavioral services and encouraged to schedule a follow up appointment with the clinic [facility] in 1 month.  He was also given information about mental health symptoms and treatment options  and deep Breathing Strategies to practice when experiencing anxiety.  CD Recommendations: No indications of CD issues.       Shawn G. Ullrich, [facility], [facility]    ______________________________________________________________________    Integrated Primary Care Behavioral Health Treatment Plan    Patient's Name: Ry Puckett  YOB: 1985    Date of Creation: 1/17/2024  Date Treatment Plan Last Reviewed/Revised: 1/17/2024    DSM5 Diagnoses: 300.02 (F41.1) Generalized Anxiety Disorder  Psychosocial / Contextual  "Factors: , , heterosexual, male; employed    PROMIS (reviewed every 90 days): pt completed on 12/19/23    Referral / Collaboration:  Referral to another professional/service is not indicated at this time..    Anticipated number of session for this episode of care: 8  Anticipation frequency of session: Monthly  Anticipated Duration of each session: 38-52 minutes  Treatment plan will be reviewed in 90 days or when goals have been changed.       MeasurableTreatment Goal(s) related to diagnosis / functional impairment(s)  Goal 1: Patient will learn and apply at least 2 new coping strategies to reduce anxiety    I will know I've met my goal when : prevent the accumulation of stress and \"that point of dread\".       Objective #A (Patient Action)    Patient will  make time for himself daily .  Status: New - Date: 1/17/24      Intervention(s)  Therapist will teach the client how to complete a 4-part pros and cons. . .    Objective #B  Patient will  use deep breathing to manage stress/anxiety .  Status: New - Date: 1/17/24      Intervention(s)  Therapist will teach diaphragmatic breathing technique and exercise .      Objective #C  Patient will  use behavioral activation strategies .  Status: New - Date: 1/17/24      Intervention(s)  Therapist will teach the client how to perform a behavioral chain analysis.   .      Patient has reviewed and agreed to the above plan.      Shawn G. Ullrich, [facility], [facility]  January 17, 2024    "

## 2024-01-17 ENCOUNTER — OFFICE VISIT (OUTPATIENT)
Dept: BEHAVIORAL HEALTH | Facility: CLINIC | Age: 39
End: 2024-01-17
Payer: COMMERCIAL

## 2024-01-17 DIAGNOSIS — F41.1 GAD (GENERALIZED ANXIETY DISORDER): Primary | ICD-10-CM

## 2024-02-13 ASSESSMENT — PATIENT HEALTH QUESTIONNAIRE - PHQ9
SUM OF ALL RESPONSES TO PHQ QUESTIONS 1-9: 3
SUM OF ALL RESPONSES TO PHQ QUESTIONS 1-9: 3
10. IF YOU CHECKED OFF ANY PROBLEMS, HOW DIFFICULT HAVE THESE PROBLEMS MADE IT FOR YOU TO DO YOUR WORK, TAKE CARE OF THINGS AT HOME, OR GET ALONG WITH OTHER PEOPLE: NOT DIFFICULT AT ALL

## 2024-02-13 ASSESSMENT — ANXIETY QUESTIONNAIRES
5. BEING SO RESTLESS THAT IT IS HARD TO SIT STILL: NOT AT ALL
2. NOT BEING ABLE TO STOP OR CONTROL WORRYING: SEVERAL DAYS
6. BECOMING EASILY ANNOYED OR IRRITABLE: NOT AT ALL
8. IF YOU CHECKED OFF ANY PROBLEMS, HOW DIFFICULT HAVE THESE MADE IT FOR YOU TO DO YOUR WORK, TAKE CARE OF THINGS AT HOME, OR GET ALONG WITH OTHER PEOPLE?: NOT DIFFICULT AT ALL
IF YOU CHECKED OFF ANY PROBLEMS ON THIS QUESTIONNAIRE, HOW DIFFICULT HAVE THESE PROBLEMS MADE IT FOR YOU TO DO YOUR WORK, TAKE CARE OF THINGS AT HOME, OR GET ALONG WITH OTHER PEOPLE: NOT DIFFICULT AT ALL
1. FEELING NERVOUS, ANXIOUS, OR ON EDGE: SEVERAL DAYS
4. TROUBLE RELAXING: NOT AT ALL
7. FEELING AFRAID AS IF SOMETHING AWFUL MIGHT HAPPEN: SEVERAL DAYS
7. FEELING AFRAID AS IF SOMETHING AWFUL MIGHT HAPPEN: SEVERAL DAYS
GAD7 TOTAL SCORE: 3
3. WORRYING TOO MUCH ABOUT DIFFERENT THINGS: NOT AT ALL

## 2024-02-13 NOTE — PROGRESS NOTES
[facility]      PATIENT'S NAME: Ry Puckett  PREFERRED NAME: [place]  PRONOUNS:  he/him     MRN: [id number]  : 1985  ADDRESS[address] [facility] [address]  [address]:  [id number]  DATE OF SERVICE: 24  PREFERRED PHONE: [phone]  May we leave a program related message: Yes  EMERGENCY CONTACT: was not obtained  .  SERVICE MODALITY:  In-person      Behavioral Health Clinician Progress Note    Patient Name: Ry Puckett           Service Type:  Individual      Service Location:   Face to Face in Clinic     Session Start Time: 10:31 am  Session End Time: 11:05 am      Session Length: 16 - 37      Attendees: Patient     Service Modality:  In-person    Visit Activities (Refresh list every visit): [facility] Only    Diagnostic Assessment Date: 23  Treatment Plan Review Date: 24  CGI Review Date: 3/20/24  Promis 10 Review Date: 3/19/24    Clinical Global Impressions  First:  Considering your total clinical experience with this particular patient population, how severe are the patient's symptoms at this time?: 4 (2023  2:00 PM)    Most recent:  No data recorded      See Flowsheets for today's PHQ-9 and ADDY-7 results  Previous PHQ-9:       2023    11:17 AM 2024     8:36 PM 2024    10:35 AM   PHQ-9 SCORE   PHQ-9 Total Score MyChart 3 (Minimal depression) 0 3 (Minimal depression)   PHQ-9 Total Score 3 0 3     Previous ADDY-7:       2023    11:18 AM 2024     8:37 PM 2024    10:40 AM   ADDY-7 SCORE   Total Score 11 (moderate anxiety) 1 (minimal anxiety) 3 (minimal anxiety)   Total Score 11 1 3       RUDI LEVEL:       No data to display                DATA  Extended Session (60+ minutes): No  Interactive Complexity: No  Crisis: No  [facility] Patient: No    Treatment Objective(s) Addressed in This Session:  Target Behavior(s):  exercise    Anxiety: will experience a reduction in anxiety, will develop more effective coping skills  "to manage anxiety symptoms, will develop healthy cognitive patterns and beliefs, and will increase ability to function adaptively    Current Stressors / Issues:  Orlando reported after previous appt, he initially did well, but a couple weeks ago tasks increased, he didn't make time for himself, and he felt depressed for several days. Orlando reported he then took a day off work and \"it worked\", his mood improved, and has been better since.  [facility] used MI and Solution focused interventions to focus on change, use of effective strategy, gain insight and understanding, and elicit change talk.  Orlando responded by sharing the following:    Sign he was not doing well  -wife observed his pessimism and decrease her communication with Orlando    Solution: take day off work  -\"It worked\", helped to recharge, and led to better mood  -better communication with wife  -more poitive about work  -producitve in and out of work    [facility] and Orlando then explored areas/issues for further change.  Orlando shared how perfectionism/\"completionism\" can get in his way at both work and then impact homelife (especially on the weekend).   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  Orlando reported then he would be able to feel more accomplished at work and will not bring work worries home.     Orlando returned to the topic of making time for himself.  Orlando reported he has not been able to exercise during the day, so instead he's been waking earlier.  Orlando reported he and wife can now alternate exercise days, and he can get something accomplished in the home before starting his day, which also feels good.  Orlando reported this has also led to \"I organize my evenings better\" because he is committed to waking earlier.      Finally, Orlando inquired about additional mindfulness strategies (likes breathing, wants to be present), so [facility] " educate Orlando about and encouraged him to practice the 5 senses.      Progress on Treatment Objective(s) / Homework:  Minimal progress - ACTION (Actively working towards change); Intervened by reinforcing change plan / affirming steps taken    Motivational Interviewing    MI Intervention: Co-Developed Goal: decrease anxiety, Expressed Empathy/Understanding, Supported Autonomy, Collaboration, Evocation, Open-ended questions, Reflections: simple and complex, and Change talk (evoked)     Change Talk Expressed by the Patient: Desire to change Ability to change Reasons to change Need to change Committment to change    Provider Response to Change Talk: E - Evoked more info from patient about behavior change, A - Affirmed patient's thoughts, decisions, or attempts at behavior change, R - Reflected patient's change talk, and S - Summarized patient's change talk statements    Solution-Focused Therapy  Explore patterns in patient's behaviors and relationships and discuss options for new behaviors  Explore new options for problem-solving, communication, managing stress, etc.    Mindfulness-Based Strategies  Discuss skills based on development and application of mindfulness  Support development of skills drawn from compassion-focused therapy, dialectical behavior therapy, mindfulness-based stress reduction, mindfulness-based cognitive therapy, etc.    Care Plan review completed: Yes    Medication Review:  No current psychiatric medications prescribed    Medication Compliance:  NA    Changes in Health Issues:   None reported    Chemical Use Review:   Substance Use: Chemical use reviewed, no active concerns identified      Tobacco Use: No current tobacco use.      Assessment: Current Emotional / Mental Status (status of significant symptoms):  Risk status (Self / Other harm or suicidal ideation)  Patient denies a history of suicidal ideation, suicide attempts, self-injurious behavior, homicidal ideation, homicidal behavior, and and  other safety concerns  Patient denies current fears or concerns for personal safety.  Patient denies current or recent suicidal ideation or behaviors.  Patient denies current or recent homicidal ideation or behaviors.  Patient denies current or recent self injurious behavior or ideation.  Patient denies other safety concerns.  A safety and risk management plan has not been developed at this time, however patient was encouraged to call [place] [phone] should there be a change in any of these risk factors.    Appearance:   Appropriate   Eye Contact:   Good   Psychomotor Behavior: Normal   Attitude:   Cooperative   Orientation:   All  Speech   Rate / Production: Normal/ Responsive   Volume:  Normal   Mood:    Anxious   Affect:    Worrisome   Thought Content:  Clear   Thought Form:  Logical   Insight:    Good     Diagnoses:  1. ADDY (generalized anxiety disorder)      Collateral Reports Completed:  Routed note to PCP    Plan: (Homework, other):  Patient was given information about behavioral services and encouraged to schedule a follow up appointment with the clinic [facility] in 1 month.  He was also given information about mental health symptoms and treatment options  and deep Breathing Strategies to practice when experiencing anxiety.  CD Recommendations: No indications of CD issues.       Shawn G. Ullrich, [facility], [facility]    ______________________________________________________________________    Integrated Primary Care Behavioral Health Treatment Plan    Patient's Name: Ry Puckett  YOB: 1985    Date of Creation: 1/17/2024  Date Treatment Plan Last Reviewed/Revised: 1/17/2024    DSM5 Diagnoses: 300.02 (F41.1) Generalized Anxiety Disorder  Psychosocial / Contextual Factors: , , heterosexual, male; employed    PROMIS (reviewed every 90 days): pt completed on 12/19/23    Referral / Collaboration:  Referral to another professional/service is not indicated at this time..    Anticipated number  "of session for this episode of care: 8  Anticipation frequency of session: Monthly  Anticipated Duration of each session: 38-52 minutes  Treatment plan will be reviewed in 90 days or when goals have been changed.       MeasurableTreatment Goal(s) related to diagnosis / functional impairment(s)  Goal 1: Patient will learn and apply at least 2 new coping strategies to reduce anxiety    I will know I've met my goal when : prevent the accumulation of stress and \"that point of dread\".       Objective #A (Patient Action)    Patient will  make time for himself daily .  Status: New - Date: 1/17/24      Intervention(s)  Therapist will teach the client how to complete a 4-part pros and cons. . .    Objective #B  Patient will  use deep breathing to manage stress/anxiety .  Status: New - Date: 1/17/24      Intervention(s)  Therapist will teach diaphragmatic breathing technique and exercise .      Objective #C  Patient will  use behavioral activation strategies .  Status: New - Date: 1/17/24      Intervention(s)  Therapist will teach the client how to perform a behavioral chain analysis.   .      Patient has reviewed and agreed to the above plan.      Shawn G. Ullrich, [facility], [facility]  January 17, 2024    "

## 2024-02-14 ENCOUNTER — OFFICE VISIT (OUTPATIENT)
Dept: BEHAVIORAL HEALTH | Facility: CLINIC | Age: 39
End: 2024-02-14
Payer: COMMERCIAL

## 2024-02-14 DIAGNOSIS — F41.1 GAD (GENERALIZED ANXIETY DISORDER): Primary | ICD-10-CM

## 2024-03-13 NOTE — PROGRESS NOTES
[facility]      PATIENT'S NAME: Ry Puckett  PREFERRED NAME: Orlando  PRONOUNS:  he/him     MRN: [id number]  : 1985  ADDRESS: [facility] [address]  [facility] [id number] [place], [place] [address]T. NUMBER:  [id number]  DATE OF SERVICE: 3/14/24  PREFERRED PHONE: [phone]  May we leave a program related message: Yes  EMERGENCY CONTACT: was not obtained  .  SERVICE MODALITY:  In-person      Behavioral Health Clinician Progress Note    Patient Name: Ry Puckett           Service Type:  Individual      Service Location:   Face to Face in Clinic     Session Start Time: 10:00 am  Session End Time: 10:38 am      Session Length: 38 - 52      Attendees: Patient     Service Modality:  In-person    Visit Activities (Refresh list every visit): [facility] Only    Diagnostic Assessment Date: 23  Treatment Plan Review Date: 24  CGI Review Date: 3/20/24  Promis 10 Review Date: 3/19/24    Clinical Global Impressions  First:  Considering your total clinical experience with this particular patient population, how severe are the patient's symptoms at this time?: 4 (2023  2:00 PM)    Most recent:  No data recorded      See Flowsheets for today's PHQ-9 and ADDY-7 results  Previous PHQ-9:       2024     8:36 PM 2024    10:35 AM 3/14/2024     7:35 AM   PHQ-9 SCORE   PHQ-9 Total Score MyChart 0 3 (Minimal depression) 0   PHQ-9 Total Score 0 3 0     Previous ADDY-7:       2024     8:37 PM 2024    10:40 AM 3/14/2024     7:36 AM   ADDY-7 SCORE   Total Score 1 (minimal anxiety) 3 (minimal anxiety) 2 (minimal anxiety)   Total Score 1 3 2       RUDI LEVEL:       No data to display                DATA  Extended Session (60+ minutes): No  Interactive Complexity: No  Crisis: No  [facility] Patient: No    Treatment Objective(s) Addressed in This Session:  Target Behavior(s):  exercise    Anxiety: will experience a reduction in anxiety, will develop more effective coping skills to manage anxiety symptoms,  "will develop healthy cognitive patterns and beliefs, and will increase ability to function adaptively    Current Stressors / Issues:  Orlando reported work is going well, he rec'd positive annual review (raise), 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  Orlando reported he is following through with strategy identified during previous appt, specifically creating small/daily tasks to complete that balance out the long-term nature of the some of the other work projects.      In regard to personal/family life, Orlando reported children are well and positive things are happening (closed on house: in-laws former house, [facility]; melvi got into the desired ), but Orlando has noticed he and his wife are so focused on parenting and daily life, that they have not had time for each other.  [facility] and Orlando problem-solved how to reconnect, Orlando reported he needs to be more proactive about talking to his wife about wanting to spend time with her and then planning/carving out the time.  Orlando reported its important for him to spend quality time with wife and actually \"celebrate\" the good things.      Finally, Orlando reported he has fallen off the exercise routine.  [facility] used MI interventions to increase motivation, and then [facility] and Orlando identified barrier and solutions.  Orlando expressed change talk, stating, he wants to get back in the habit of waking early and doing some morning exercise and \"regain that sense of commitment\".  Orlando noted barrier to exercise was his routine was disrupted due to daylight savings.  Orlando identified strategy, explaining his morning go better if he prepares the night before, so he will prep for the morning by setting out his work out clothes the night before.     Progress on Treatment " Objective(s) / Homework:  Minimal progress - ACTION (Actively working towards change); Intervened by reinforcing change plan / affirming steps taken    Motivational Interviewing    MI Intervention: Co-Developed Goal: decrease anxiety, Expressed Empathy/Understanding, Supported Autonomy, Collaboration, Evocation, Open-ended questions, Reflections: simple and complex, and Change talk (evoked)     Change Talk Expressed by the Patient: Desire to change Ability to change Reasons to change Need to change Committment to change    Provider Response to Change Talk: E - Evoked more info from patient about behavior change, A - Affirmed patient's thoughts, decisions, or attempts at behavior change, R - Reflected patient's change talk, and S - Summarized patient's change talk statements    Solution-Focused Therapy  Explore patterns in patient's behaviors and relationships and discuss options for new behaviors  Explore new options for problem-solving, communication, managing stress, etc.      Care Plan review completed: Yes    Medication Review:  No current psychiatric medications prescribed    Medication Compliance:  NA    Changes in Health Issues:   None reported    Chemical Use Review:   Substance Use: Chemical use reviewed, no active concerns identified      Tobacco Use: No current tobacco use.      Assessment: Current Emotional / Mental Status (status of significant symptoms):  Risk status (Self / Other harm or suicidal ideation)  Patient denies a history of suicidal ideation, suicide attempts, self-injurious behavior, homicidal ideation, homicidal behavior, and and other safety concerns  Patient denies current fears or concerns for personal safety.  Patient denies current or recent suicidal ideation or behaviors.  Patient denies current or recent homicidal ideation or behaviors.  Patient denies current or recent self injurious behavior or ideation.  Patient denies other safety concerns.  A safety and risk management plan  has not been developed at this time, however patient was encouraged to call [place] [phone] should there be a change in any of these risk factors.    Appearance:   Appropriate   Eye Contact:   Good   Psychomotor Behavior: Normal   Attitude:   Cooperative   Orientation:   All  Speech   Rate / Production: Normal/ Responsive   Volume:  Normal   Mood:    Anxious   Affect:    Congruent with mood   Thought Content:  Clear   Thought Form:  Logical   Insight:    Good     Diagnoses:  1. ADDY (generalized anxiety disorder)        Collateral Reports Completed:  Routed note to PCP    Plan: (Homework, other):  Patient was given information about behavioral services and encouraged to schedule a follow up appointment with the clinic [facility] in 1 month.  He was also given information about mental health symptoms and treatment options  and deep Breathing Strategies to practice when experiencing anxiety.  CD Recommendations: No indications of CD issues.       Shawn G. Ullrich, [facility], [facility]    ______________________________________________________________________    Integrated Primary Care Behavioral Health Treatment Plan    Patient's Name: Ry Puckett  YOB: 1985    Date of Creation: 1/17/2024  Date Treatment Plan Last Reviewed/Revised: 1/17/2024    DSM5 Diagnoses: 300.02 (F41.1) Generalized Anxiety Disorder  Psychosocial / Contextual Factors: , , heterosexual, male; employed    PROMIS (reviewed every 90 days): pt completed on 12/19/23    Referral / Collaboration:  Referral to another professional/service is not indicated at this time..    Anticipated number of session for this episode of care: 8  Anticipation frequency of session: Monthly  Anticipated Duration of each session: 38-52 minutes  Treatment plan will be reviewed in 90 days or when goals have been changed.       MeasurableTreatment Goal(s) related to diagnosis / functional impairment(s)  Goal 1: Patient will learn and apply at least 2 new  "coping strategies to reduce anxiety    I will know I've met my goal when : prevent the accumulation of stress and \"that point of dread\".       Objective #A (Patient Action)    Patient will  make time for himself daily .  Status: New - Date: 1/17/24      Intervention(s)  Therapist will teach the client how to complete a 4-part pros and cons. . .    Objective #B  Patient will  use deep breathing to manage stress/anxiety .  Status: New - Date: 1/17/24      Intervention(s)  Therapist will teach diaphragmatic breathing technique and exercise .      Objective #C  Patient will  use behavioral activation strategies .  Status: New - Date: 1/17/24      Intervention(s)  Therapist will teach the client how to perform a behavioral chain analysis.   .      Patient has reviewed and agreed to the above plan.      Shawn G. Ullrich, [facility], [facility]  January 17, 2024    "

## 2024-03-14 ENCOUNTER — OFFICE VISIT (OUTPATIENT)
Dept: BEHAVIORAL HEALTH | Facility: CLINIC | Age: 39
End: 2024-03-14
Payer: COMMERCIAL

## 2024-03-14 DIAGNOSIS — F41.1 GAD (GENERALIZED ANXIETY DISORDER): Primary | ICD-10-CM

## 2024-03-14 ASSESSMENT — ANXIETY QUESTIONNAIRES
3. WORRYING TOO MUCH ABOUT DIFFERENT THINGS: NOT AT ALL
7. FEELING AFRAID AS IF SOMETHING AWFUL MIGHT HAPPEN: NOT AT ALL
GAD7 TOTAL SCORE: 2
5. BEING SO RESTLESS THAT IT IS HARD TO SIT STILL: NOT AT ALL
8. IF YOU CHECKED OFF ANY PROBLEMS, HOW DIFFICULT HAVE THESE MADE IT FOR YOU TO DO YOUR WORK, TAKE CARE OF THINGS AT HOME, OR GET ALONG WITH OTHER PEOPLE?: NOT DIFFICULT AT ALL
GAD7 TOTAL SCORE: 2
IF YOU CHECKED OFF ANY PROBLEMS ON THIS QUESTIONNAIRE, HOW DIFFICULT HAVE THESE PROBLEMS MADE IT FOR YOU TO DO YOUR WORK, TAKE CARE OF THINGS AT HOME, OR GET ALONG WITH OTHER PEOPLE: NOT DIFFICULT AT ALL
4. TROUBLE RELAXING: NOT AT ALL
6. BECOMING EASILY ANNOYED OR IRRITABLE: SEVERAL DAYS
7. FEELING AFRAID AS IF SOMETHING AWFUL MIGHT HAPPEN: NOT AT ALL
2. NOT BEING ABLE TO STOP OR CONTROL WORRYING: SEVERAL DAYS
GAD7 TOTAL SCORE: 2
1. FEELING NERVOUS, ANXIOUS, OR ON EDGE: NOT AT ALL

## 2024-03-14 ASSESSMENT — PATIENT HEALTH QUESTIONNAIRE - PHQ9
SUM OF ALL RESPONSES TO PHQ QUESTIONS 1-9: 0
SUM OF ALL RESPONSES TO PHQ QUESTIONS 1-9: 0
10. IF YOU CHECKED OFF ANY PROBLEMS, HOW DIFFICULT HAVE THESE PROBLEMS MADE IT FOR YOU TO DO YOUR WORK, TAKE CARE OF THINGS AT HOME, OR GET ALONG WITH OTHER PEOPLE: NOT DIFFICULT AT ALL

## 2024-04-22 NOTE — PROGRESS NOTES
[facility]      PATIENT'S NAME: Ry Puckett  PREFERRED NAME: Orlando  PRONOUNS:  he/him     MRN: [id number]  : 1985  ADDRESS: [facility] [address]  [facility] [id number] [place], [place] [address]T. NUMBER:  [id number]  DATE OF SERVICE: 24  PREFERRED PHONE: [phone]  May we leave a program related message: Yes  EMERGENCY CONTACT: was not obtained  .  SERVICE MODALITY:  In-person      Behavioral Health Clinician Progress Note    Patient Name: Ry Puckett           Service Type:  Individual      Service Location:   Face to Face in Clinic     Session Start Time: 10:00 am  Session End Time: 10:46 am      Session Length: 38 - 52      Attendees: Patient     Service Modality:  In-person    Visit Activities (Refresh list every visit): [facility] Only    Diagnostic Assessment Date: 23  Treatment Plan Review Date: 24  CGI Review Date: 24  Promis 10 Review Date: 24    Clinical Global Impressions  First:  Considering your total clinical experience with this particular patient population, how severe are the patient's symptoms at this time?: 4 (2024 10:55 AM)    Most recent:  Compared to the patient's condition at the START of treatment, this patient's condition is: 3 (2024 10:55 AM)        See Flowsheets for today's PHQ-9 and ADDY-7 results  Previous PHQ-9:       2024    10:35 AM 3/14/2024     7:35 AM 2024    10:05 PM   PHQ-9 SCORE   PHQ-9 Total Score MyChart 3 (Minimal depression) 0 2 (Minimal depression)   PHQ-9 Total Score 3 0 2     Previous ADDY-7:       2024    10:40 AM 3/14/2024     7:36 AM 2024    10:07 PM   ADDY-7 SCORE   Total Score 3 (minimal anxiety) 2 (minimal anxiety) 3 (minimal anxiety)   Total Score 3 2 3       DATA  Extended Session (60+ minutes): No  Interactive Complexity: No  Crisis: No  [facility] Patient: No    Treatment Objective(s) Addressed in This Session:  Target Behavior(s):  exercise    Anxiety: will experience a reduction in anxiety,  "will develop more effective coping skills to manage anxiety symptoms, will develop healthy cognitive patterns and beliefs, and will increase ability to function adaptively    Current Stressors / Issues:  While Orlando reported they are two weeks from moving into their new home, which is exciting, his son experienced a febrile seizure which was frightening and led to worsening worry thoughts (\"spiraled\") and negative emotions. Orlando's also experienced marital challenges, primarily communication issues, sharing examples of when he felt his wife was not receptive to his ideas. Orlando reported his son's incident and his communication issues are connected because even though he didn't share worry/negative thoughts with wife, he wondered aloud if he should've?    [facility] reflected the difficulty of being vulnerable with somebody when you're uncertain they will be open to what you have to say.  In order to learn more this issue, [facility] and Orlando reviewed examples and then identified potential strategies to improve communication and connection.   Orlando reported the following:    Factors that impact communication:  -Most of the time they talk at night, after putting the kids down and they are exhausted and emotionally empty?  -having conversations at the wrong time, place?  -having conversations when either Orlando, Wife, or both have limited mental availability    Strategies:  -creating the space for a conversation  -change environment  -change time to talk  -Talk after sex  -Talk during walk  -email so wife can think about things on her own time, when she has mental capacity  -ask her if she can talk or if she needs to address any stressors first   -Being more intentional about connecting; ie conducive times to talk and interact just don't happen when you have small children and two full time jobs    Lastly, Orlando reported while his exercise routine has not been what he had originally envisioned, \"its been OK\", stating its been a couple days per " "week, he's more consistent with purposeful movement, streching ([employer]).  Orlando reported he will continue with exercise, noting its not only good for him physically, but its also \"time for myself\" which is mentally beneficial.       Progress on Treatment Objective(s) / Homework:  No improvement - PREPARATION (Decided to change - considering how); Intervened by negotiating a change plan and determining options / strategies for behavior change, identifying triggers, exploring social supports, and working towards setting a date to begin behavior change    Motivational Interviewing    MI Intervention: Co-Developed Goal: decrease anxiety, Expressed Empathy/Understanding, Supported Autonomy, Collaboration, Evocation, Open-ended questions, Reflections: simple and complex, and Change talk (evoked)     Change Talk Expressed by the Patient: Desire to change Ability to change Reasons to change Need to change Committment to change    Provider Response to Change Talk: E - Evoked more info from patient about behavior change, A - Affirmed patient's thoughts, decisions, or attempts at behavior change, R - Reflected patient's change talk, and S - Summarized patient's change talk statements    Solution-Focused Therapy  Explore patterns in patient's behaviors and relationships and discuss options for new behaviors  Explore new options for problem-solving, communication, managing stress, etc.    Interpersonal Psychotherapy  Explore patterns in relationships that are effective or ineffective at helping patient reach their goals, find satisfying experience.  Discuss new patterns or behaviors to engage in for improved social functioning.    Care Plan review completed: Yes    Medication Review:  No current psychiatric medications prescribed    Medication Compliance:  NA    Changes in Health Issues:   None reported    Chemical Use Review:   Substance Use: Chemical use reviewed, no active concerns identified      Tobacco Use: No current " tobacco use.      Assessment: Current Emotional / Mental Status (status of significant symptoms):  Risk status (Self / Other harm or suicidal ideation)  Patient denies a history of suicidal ideation, suicide attempts, self-injurious behavior, homicidal ideation, homicidal behavior, and and other safety concerns  Patient denies current fears or concerns for personal safety.  Patient denies current or recent suicidal ideation or behaviors.  Patient denies current or recent homicidal ideation or behaviors.  Patient denies current or recent self injurious behavior or ideation.  Patient denies other safety concerns.  A safety and risk management plan has not been developed at this time, however patient was encouraged to call [place] [phone] should there be a change in any of these risk factors.    Appearance:   Appropriate   Eye Contact:   Good   Psychomotor Behavior: Normal   Attitude:   Cooperative   Orientation:   All  Speech   Rate / Production: Normal/ Responsive   Volume:  Normal   Mood:    Anxious , low  Affect:    Congruent with mood   Thought Content:  Clear   Thought Form:  Logical   Insight:    Good     Diagnoses:  1. ADDY (generalized anxiety disorder)      Collateral Reports Completed:  Routed note to PCP    Plan: (Homework, other):  Patient was given information about behavioral services and encouraged to schedule a follow up appointment with the clinic [facility] in 1 month.  He was also given information about mental health symptoms and treatment options  and deep Breathing Strategies to practice when experiencing anxiety.  CD Recommendations: No indications of CD issues.       Shawn G. Ullrich, [facility], [facility]    ______________________________________________________________________    Integrated Primary Care Behavioral Health Treatment Plan    Patient's Name: Ry Puckett  YOB: 1985    Date of Creation: 1/17/2024  Date Treatment Plan Last Reviewed/Revised: 1/17/2024    DSM5 Diagnoses: 300.02  "(F41.1) Generalized Anxiety Disorder  Psychosocial / Contextual Factors: , , heterosexual, male; employed    PROMIS (reviewed every 90 days): pt completed on 12/19/23    Referral / Collaboration:  Referral to another professional/service is not indicated at this time..    Anticipated number of session for this episode of care: 8  Anticipation frequency of session: Monthly  Anticipated Duration of each session: 38-52 minutes  Treatment plan will be reviewed in 90 days or when goals have been changed.       MeasurableTreatment Goal(s) related to diagnosis / functional impairment(s)  Goal 1: Patient will learn and apply at least 2 new coping strategies to reduce anxiety    I will know I've met my goal when : prevent the accumulation of stress and \"that point of dread\".       Objective #A (Patient Action)    Patient will  make time for himself daily .  Status: New - Date: 1/17/24      Intervention(s)  Therapist will teach the client how to complete a 4-part pros and cons. . .    Objective #B  Patient will  use deep breathing to manage stress/anxiety .  Status: New - Date: 1/17/24      Intervention(s)  Therapist will teach diaphragmatic breathing technique and exercise .      Objective #C  Patient will  use behavioral activation strategies .  Status: New - Date: 1/17/24      Intervention(s)  Therapist will teach the client how to perform a behavioral chain analysis.   .      Patient has reviewed and agreed to the above plan.      Shawn G. Ullrich, [facility], [facility]  January 17, 2024    "

## 2024-04-23 ASSESSMENT — ANXIETY QUESTIONNAIRES
GAD7 TOTAL SCORE: 3
4. TROUBLE RELAXING: SEVERAL DAYS
1. FEELING NERVOUS, ANXIOUS, OR ON EDGE: SEVERAL DAYS
3. WORRYING TOO MUCH ABOUT DIFFERENT THINGS: SEVERAL DAYS
2. NOT BEING ABLE TO STOP OR CONTROL WORRYING: NOT AT ALL
5. BEING SO RESTLESS THAT IT IS HARD TO SIT STILL: NOT AT ALL
6. BECOMING EASILY ANNOYED OR IRRITABLE: NOT AT ALL
GAD7 TOTAL SCORE: 3
7. FEELING AFRAID AS IF SOMETHING AWFUL MIGHT HAPPEN: NOT AT ALL
IF YOU CHECKED OFF ANY PROBLEMS ON THIS QUESTIONNAIRE, HOW DIFFICULT HAVE THESE PROBLEMS MADE IT FOR YOU TO DO YOUR WORK, TAKE CARE OF THINGS AT HOME, OR GET ALONG WITH OTHER PEOPLE: NOT DIFFICULT AT ALL
GAD7 TOTAL SCORE: 3
7. FEELING AFRAID AS IF SOMETHING AWFUL MIGHT HAPPEN: NOT AT ALL
8. IF YOU CHECKED OFF ANY PROBLEMS, HOW DIFFICULT HAVE THESE MADE IT FOR YOU TO DO YOUR WORK, TAKE CARE OF THINGS AT HOME, OR GET ALONG WITH OTHER PEOPLE?: NOT DIFFICULT AT ALL

## 2024-04-23 ASSESSMENT — PATIENT HEALTH QUESTIONNAIRE - PHQ9
SUM OF ALL RESPONSES TO PHQ QUESTIONS 1-9: 2
SUM OF ALL RESPONSES TO PHQ QUESTIONS 1-9: 2
10. IF YOU CHECKED OFF ANY PROBLEMS, HOW DIFFICULT HAVE THESE PROBLEMS MADE IT FOR YOU TO DO YOUR WORK, TAKE CARE OF THINGS AT HOME, OR GET ALONG WITH OTHER PEOPLE: NOT DIFFICULT AT ALL

## 2024-04-24 ENCOUNTER — OFFICE VISIT (OUTPATIENT)
Dept: BEHAVIORAL HEALTH | Facility: CLINIC | Age: 39
End: 2024-04-24
Payer: COMMERCIAL

## 2024-04-24 DIAGNOSIS — F41.1 GAD (GENERALIZED ANXIETY DISORDER): Primary | ICD-10-CM

## 2024-05-28 NOTE — PROGRESS NOTES
[facility]      PATIENT'S NAME: Ry Puckett  PREFERRED NAME: Orlando  PRONOUNS:  he/him     MRN: [id number]  : 1985  ADDRESS: [facility] [address]  [facility] [id number] [place], [place] [address]T. NUMBER:  [id number]  DATE OF SERVICE: 24  PREFERRED PHONE: [phone]  May we leave a program related message: Yes  EMERGENCY CONTACT: was not obtained  .  SERVICE MODALITY:  In-person      Behavioral Health Clinician Progress Note    Patient Name: Ry Puckett           Service Type:  Individual      Service Location:   Face to Face in Clinic     Session Start Time: 10:03 am  Session End Time: 10:46 am      Session Length: 38 - 52      Attendees: Patient     Service Modality:  In-person    Visit Activities (Refresh list every visit): [facility] Only    Diagnostic Assessment Date: 23  Treatment Plan Review Date: 24  CGI Review Date: 24  Promis 10 Review Date: 24    Clinical Global Impressions  First:  Considering your total clinical experience with this particular patient population, how severe are the patient's symptoms at this time?: 4 (2024 10:55 AM)    Most recent:  Compared to the patient's condition at the START of treatment, this patient's condition is: 3 (2024 10:55 AM)        See Flowsheets for today's PHQ-9 and ADDY-7 results  Previous PHQ-9:       3/14/2024     7:35 AM 2024    10:05 PM 2024     9:28 AM   PHQ-9 SCORE   PHQ-9 Total Score MyChart 0 2 (Minimal depression) 3 (Minimal depression)   PHQ-9 Total Score 0 2 3     Previous ADDY-7:       3/14/2024     7:36 AM 2024    10:07 PM 2024     9:30 AM   ADDY-7 SCORE   Total Score 2 (minimal anxiety) 3 (minimal anxiety) 4 (minimal anxiety)   Total Score 2 3 4       DATA  Extended Session (60+ minutes): No  Interactive Complexity: No  Crisis: No  [facility] Patient: No    Treatment Objective(s) Addressed in This Session:  Target Behavior(s):  exercise    Anxiety: will experience a reduction in anxiety,  "will develop more effective coping skills to manage anxiety symptoms, will develop healthy cognitive patterns and beliefs, and will increase ability to function adaptively    Current Stressors / Issues:    Orlando and his family move into the new home and while being in the house has been great, its also given him more things to do, which means more things on his mind.  When combined with parenting, work, being a good , and taking care of himself, he feels \"stretched in different directions\".   Orlando explained the impact, stating, \"I'm thinking about these things all the time, which makes me anxious, which then makes me short with family...\"  [facility] used MI and Sol'n Focused interventions to identify coping skills and then with Orlando's permission, [facility] educated Orlando about mindfulness and how to non-judgmental observation of emotions can lead to decreased reactivity, decreased emotional dysregulation, and improved mood.  Orlando identified the following strategies/plan:    Plan:  -continue to use effective communication skills with wife  -using better times to talk  -checking in with wife about is this an OK time to talk  -email    -Being able to delegate   -don't assume you have to do everything  -ask wife for help--can you help me to figure it out?    -Get up earlier so he's \"in sync\" with wife and they can talk thru the day before the kids get up   -ask Amairani to open the curtains or open the door    -Spend more emjoyable time with Vishal    -Watch out for physical feelings (warning signs) and then take a deep breath    -Mindfulness: non-judgmental observation of emotions/thoughts      Progress on Treatment Objective(s) / Homework:  Minimal progress - ACTION (Actively working towards change); Intervened by reinforcing change plan / affirming steps taken    Motivational Interviewing    MI Intervention: Co-Developed Goal: decrease anxiety, Expressed Empathy/Understanding, Supported Autonomy, Collaboration, Evocation, Open-ended " questions, Reflections: simple and complex, and Change talk (evoked)     Change Talk Expressed by the Patient: Desire to change Ability to change Reasons to change Need to change Committment to change    Provider Response to Change Talk: E - Evoked more info from patient about behavior change, A - Affirmed patient's thoughts, decisions, or attempts at behavior change, R - Reflected patient's change talk, and S - Summarized patient's change talk statements    Solution-Focused Therapy  Explore patterns in patient's behaviors and relationships and discuss options for new behaviors  Explore new options for problem-solving, communication, managing stress, etc.    Interpersonal Psychotherapy  Explore patterns in relationships that are effective or ineffective at helping patient reach their goals, find satisfying experience.  Discuss new patterns or behaviors to engage in for improved social functioning.    Mindfulness-Based Strategies  Discuss skills based on development and application of mindfulness  Support development of skills drawn from compassion-focused therapy, dialectical behavior therapy, mindfulness-based stress reduction, mindfulness-based cognitive therapy, etc.      Care Plan review completed: Yes    Medication Review:  No current psychiatric medications prescribed    Medication Compliance:  NA    Changes in Health Issues:   None reported    Chemical Use Review:   Substance Use: Chemical use reviewed, no active concerns identified      Tobacco Use: No current tobacco use.      Assessment: Current Emotional / Mental Status (status of significant symptoms):  Risk status (Self / Other harm or suicidal ideation)  Patient denies a history of suicidal ideation, suicide attempts, self-injurious behavior, homicidal ideation, homicidal behavior, and and other safety concerns  Patient denies current fears or concerns for personal safety.  Patient denies current or recent suicidal ideation or behaviors.  Patient  denies current or recent homicidal ideation or behaviors.  Patient denies current or recent self injurious behavior or ideation.  Patient denies other safety concerns.  A safety and risk management plan has not been developed at this time, however patient was encouraged to call [place] [phone] should there be a change in any of these risk factors.    Appearance:   Appropriate   Eye Contact:   Good   Psychomotor Behavior: Normal   Attitude:   Cooperative   Orientation:   All  Speech   Rate / Production: Normal/ Responsive   Volume:  Normal   Mood:    Anxious , low  Affect:    Worrisome   Thought Content:  Clear   Thought Form:  Logical   Insight:    Good     Diagnoses:  1. ADDY (generalized anxiety disorder)        Collateral Reports Completed:  Routed note to PCP    Plan: (Homework, other):  Patient was given information about behavioral services and encouraged to schedule a follow up appointment with the clinic [facility]  5 weeks .  He was also given information about mental health symptoms and treatment options  and deep Breathing Strategies to practice when experiencing anxiety.  CD Recommendations: No indications of CD issues.       Shawn G. Ullrich, [facility], [facility]    ______________________________________________________________________    Integrated Primary Care Behavioral Health Treatment Plan    Patient's Name: Ry Puckett  YOB: 1985    Date of Creation: 1/17/2024  Date Treatment Plan Last Reviewed/Revised: 1/17/2024    DSM5 Diagnoses: 300.02 (F41.1) Generalized Anxiety Disorder  Psychosocial / Contextual Factors: , , heterosexual, male; employed    PROMIS (reviewed every 90 days): pt completed on 12/19/23    Referral / Collaboration:  Referral to another professional/service is not indicated at this time..    Anticipated number of session for this episode of care: 8  Anticipation frequency of session: Monthly  Anticipated Duration of each session: 38-52 minutes  Treatment plan  "will be reviewed in 90 days or when goals have been changed.       MeasurableTreatment Goal(s) related to diagnosis / functional impairment(s)  Goal 1: Patient will learn and apply at least 2 new coping strategies to reduce anxiety    I will know I've met my goal when : prevent the accumulation of stress and \"that point of dread\".       Objective #A (Patient Action)    Patient will  make time for himself daily .  Status: New - Date: 1/17/24      Intervention(s)  Therapist will teach the client how to complete a 4-part pros and cons. . .    Objective #B  Patient will  use deep breathing to manage stress/anxiety .  Status: New - Date: 1/17/24      Intervention(s)  Therapist will teach diaphragmatic breathing technique and exercise .      Objective #C  Patient will  use behavioral activation strategies .  Status: New - Date: 1/17/24      Intervention(s)  Therapist will teach the client how to perform a behavioral chain analysis.   .      Patient has reviewed and agreed to the above plan.      Shawn G. Ullrich, [facility], [facility]  January 17, 2024    "

## 2024-05-29 ENCOUNTER — OFFICE VISIT (OUTPATIENT)
Dept: BEHAVIORAL HEALTH | Facility: CLINIC | Age: 39
End: 2024-05-29
Payer: COMMERCIAL

## 2024-05-29 DIAGNOSIS — F41.1 GAD (GENERALIZED ANXIETY DISORDER): Primary | ICD-10-CM

## 2024-05-29 ASSESSMENT — PATIENT HEALTH QUESTIONNAIRE - PHQ9
SUM OF ALL RESPONSES TO PHQ QUESTIONS 1-9: 3
10. IF YOU CHECKED OFF ANY PROBLEMS, HOW DIFFICULT HAVE THESE PROBLEMS MADE IT FOR YOU TO DO YOUR WORK, TAKE CARE OF THINGS AT HOME, OR GET ALONG WITH OTHER PEOPLE: NOT DIFFICULT AT ALL
SUM OF ALL RESPONSES TO PHQ QUESTIONS 1-9: 3

## 2024-05-29 ASSESSMENT — ANXIETY QUESTIONNAIRES
3. WORRYING TOO MUCH ABOUT DIFFERENT THINGS: SEVERAL DAYS
7. FEELING AFRAID AS IF SOMETHING AWFUL MIGHT HAPPEN: NOT AT ALL
IF YOU CHECKED OFF ANY PROBLEMS ON THIS QUESTIONNAIRE, HOW DIFFICULT HAVE THESE PROBLEMS MADE IT FOR YOU TO DO YOUR WORK, TAKE CARE OF THINGS AT HOME, OR GET ALONG WITH OTHER PEOPLE: NOT DIFFICULT AT ALL
GAD7 TOTAL SCORE: 4
GAD7 TOTAL SCORE: 4
6. BECOMING EASILY ANNOYED OR IRRITABLE: SEVERAL DAYS
GAD7 TOTAL SCORE: 4
4. TROUBLE RELAXING: NOT AT ALL
1. FEELING NERVOUS, ANXIOUS, OR ON EDGE: SEVERAL DAYS
8. IF YOU CHECKED OFF ANY PROBLEMS, HOW DIFFICULT HAVE THESE MADE IT FOR YOU TO DO YOUR WORK, TAKE CARE OF THINGS AT HOME, OR GET ALONG WITH OTHER PEOPLE?: NOT DIFFICULT AT ALL
7. FEELING AFRAID AS IF SOMETHING AWFUL MIGHT HAPPEN: NOT AT ALL
2. NOT BEING ABLE TO STOP OR CONTROL WORRYING: SEVERAL DAYS
5. BEING SO RESTLESS THAT IT IS HARD TO SIT STILL: NOT AT ALL

## 2024-07-03 ENCOUNTER — OFFICE VISIT (OUTPATIENT)
Dept: BEHAVIORAL HEALTH | Facility: CLINIC | Age: 39
End: 2024-07-03
Payer: COMMERCIAL

## 2024-07-03 DIAGNOSIS — F41.1 GAD (GENERALIZED ANXIETY DISORDER): Primary | ICD-10-CM

## 2024-07-03 ASSESSMENT — PATIENT HEALTH QUESTIONNAIRE - PHQ9
SUM OF ALL RESPONSES TO PHQ QUESTIONS 1-9: 1
SUM OF ALL RESPONSES TO PHQ QUESTIONS 1-9: 1
10. IF YOU CHECKED OFF ANY PROBLEMS, HOW DIFFICULT HAVE THESE PROBLEMS MADE IT FOR YOU TO DO YOUR WORK, TAKE CARE OF THINGS AT HOME, OR GET ALONG WITH OTHER PEOPLE: NOT DIFFICULT AT ALL

## 2024-07-03 ASSESSMENT — ANXIETY QUESTIONNAIRES
4. TROUBLE RELAXING: NOT AT ALL
GAD7 TOTAL SCORE: 1
7. FEELING AFRAID AS IF SOMETHING AWFUL MIGHT HAPPEN: NOT AT ALL
8. IF YOU CHECKED OFF ANY PROBLEMS, HOW DIFFICULT HAVE THESE MADE IT FOR YOU TO DO YOUR WORK, TAKE CARE OF THINGS AT HOME, OR GET ALONG WITH OTHER PEOPLE?: NOT DIFFICULT AT ALL
7. FEELING AFRAID AS IF SOMETHING AWFUL MIGHT HAPPEN: NOT AT ALL
GAD7 TOTAL SCORE: 1
2. NOT BEING ABLE TO STOP OR CONTROL WORRYING: NOT AT ALL
1. FEELING NERVOUS, ANXIOUS, OR ON EDGE: NOT AT ALL
3. WORRYING TOO MUCH ABOUT DIFFERENT THINGS: NOT AT ALL
IF YOU CHECKED OFF ANY PROBLEMS ON THIS QUESTIONNAIRE, HOW DIFFICULT HAVE THESE PROBLEMS MADE IT FOR YOU TO DO YOUR WORK, TAKE CARE OF THINGS AT HOME, OR GET ALONG WITH OTHER PEOPLE: NOT DIFFICULT AT ALL
5. BEING SO RESTLESS THAT IT IS HARD TO SIT STILL: NOT AT ALL
GAD7 TOTAL SCORE: 1
6. BECOMING EASILY ANNOYED OR IRRITABLE: SEVERAL DAYS

## 2024-07-03 NOTE — PROGRESS NOTES
[facility]      PATIENT'S NAME: Ry Puckett  PREFERRED NAME: Orlando  PRONOUNS:  he/him     MRN: [id number]  : 1985  ADDRESS: [facility] [address]  [facility] [address] [place], [place] [address]T. NUMBER:  [id number]  DATE OF SERVICE: 24  PREFERRED PHONE: [phone]  May we leave a program related message: Yes  EMERGENCY CONTACT: was not obtained  .  SERVICE MODALITY:  In-person      Behavioral Health Clinician Progress Note    Patient Name: Ry Puckett           Service Type:  Individual      Service Location:   Face to Face in Clinic     Session Start Time: 10:02 am  Session End Time: 10:48 am      Session Length: 38 - 52      Attendees: Patient     Service Modality:  In-person    Visit Activities (Refresh list every visit): [facility] Only    Diagnostic Assessment Date: 23  Treatment Plan Review Date: 24  CGI Review Date: 24  Promis 10 Review Date: 24    Clinical Global Impressions  First:  Considering your total clinical experience with this particular patient population, how severe are the patient's symptoms at this time?: 4 (2024 10:55 AM)    Most recent:  Compared to the patient's condition at the START of treatment, this patient's condition is: 3 (2024 10:55 AM)        See Flowsheets for today's PHQ-9 and ADDY-7 results  Previous PHQ-9:       2024    10:05 PM 2024     9:28 AM 7/3/2024     7:35 AM   PHQ-9 SCORE   PHQ-9 Total Score MyChart 2 (Minimal depression) 3 (Minimal depression) 1 (Minimal depression)   PHQ-9 Total Score 2 3 1     Previous ADDY-7:       2024    10:07 PM 2024     9:30 AM 7/3/2024     7:35 AM   ADDY-7 SCORE   Total Score 3 (minimal anxiety) 4 (minimal anxiety) 1 (minimal anxiety)   Total Score 3 4 1       DATA  Extended Session (60+ minutes): No  Interactive Complexity: No  Crisis: No  [facility] Patient: No    Treatment Objective(s) Addressed in This Session:  Target Behavior(s):  exercise    Anxiety: will experience a  "reduction in anxiety, will develop more effective coping skills to manage anxiety symptoms, will develop healthy cognitive patterns and beliefs, and will increase ability to function adaptively    Current Stressors / Issues:  POONAM and Orlando reviewed ongoing challenges with managing his own mh, parenting, and marriage.  [facility] used MI and Sol'n Focused interventions to assess progress, identify areas for improvement, and list coping strategies he can use to make further progress.  While Orlando reported making good progress, he identified various issues that continue to cause stress including needing small breaks when parenting, setting boundaries with children, not overreacting to children's behaviors, being overly self-critical, making time for him and his wife, and engaging their support network.  POONAM and Orlando explored and dicussed multiple potential coping strategies, and he reported there are two strategies that will help with all of these issues, namely, being mindful/stop judging himself and communicating effectively with his wife.   Orlando created the following:      Plan:  Communication with wife   -\"We're a team when communicating well\"   -there are multiple ways to communicate (eg email, face to face, etc)   -ask if its an OK time talk/choose a conducive time to talk    Issues to talk about: their marriage/time for themselves; increasing support from her parents (so they can have time for themselves); parenting; taking short breaks when parenting    Mindfulness and reflection  -Non-judgmental  -\"Stopped judging myself\"  -\"don't be harsh on myself\"  -recognize     POONAM and orlando will follow up in two months     Progress on Treatment Objective(s) / Homework:  Minimal progress - ACTION (Actively working towards change); Intervened by reinforcing change plan / affirming steps taken    Motivational Interviewing    MI Intervention: Co-Developed Goal: decrease anxiety, Expressed Empathy/Understanding, Supported Autonomy, " Collaboration, Evocation, Open-ended questions, Reflections: simple and complex, and Change talk (evoked)     Change Talk Expressed by the Patient: Desire to change Ability to change Reasons to change Need to change Committment to change    Provider Response to Change Talk: E - Evoked more info from patient about behavior change, A - Affirmed patient's thoughts, decisions, or attempts at behavior change, R - Reflected patient's change talk, and S - Summarized patient's change talk statements    Solution-Focused Therapy  Explore patterns in patient's behaviors and relationships and discuss options for new behaviors  Explore new options for problem-solving, communication, managing stress, etc.    Interpersonal Psychotherapy  Explore patterns in relationships that are effective or ineffective at helping patient reach their goals, find satisfying experience.  Discuss new patterns or behaviors to engage in for improved social functioning.    Mindfulness-Based Strategies  Discuss skills based on development and application of mindfulness  Support development of skills drawn from compassion-focused therapy, dialectical behavior therapy, mindfulness-based stress reduction, mindfulness-based cognitive therapy, etc.      Care Plan review completed: Yes    Medication Review:  No current psychiatric medications prescribed    Medication Compliance:  NA    Changes in Health Issues:   None reported    Chemical Use Review:   Substance Use: Chemical use reviewed, no active concerns identified      Tobacco Use: No current tobacco use.      Assessment: Current Emotional / Mental Status (status of significant symptoms):  Risk status (Self / Other harm or suicidal ideation)  Patient denies a history of suicidal ideation, suicide attempts, self-injurious behavior, homicidal ideation, homicidal behavior, and and other safety concerns  Patient denies current fears or concerns for personal safety.  Patient denies current or recent suicidal  ideation or behaviors.  Patient denies current or recent homicidal ideation or behaviors.  Patient denies current or recent self injurious behavior or ideation.  Patient denies other safety concerns.  A safety and risk management plan has not been developed at this time, however patient was encouraged to call [place] [phone] should there be a change in any of these risk factors.    Appearance:   Appropriate   Eye Contact:   Good   Psychomotor Behavior: Normal   Attitude:   Cooperative   Orientation:   All  Speech   Rate / Production: Normal/ Responsive   Volume:  Normal   Mood:    Anxious ,  Affect:    Congruent with mood   Thought Content:  Clear   Thought Form:  Logical   Insight:    Good     Diagnoses:  1. ADDY (generalized anxiety disorder)          Collateral Reports Completed:  Routed note to PCP    Plan: (Homework, other):  Patient was given information about behavioral services and encouraged to schedule a follow up appointment with the clinic [facility]  2 months .  He was also given information about mental health symptoms and treatment options  and deep Breathing Strategies to practice when experiencing anxiety.  CD Recommendations: No indications of CD issues.       Shawn G. Ullrich, [facility], [facility]    ______________________________________________________________________    Integrated Primary Care Behavioral Health Treatment Plan    Patient's Name: Ry Puckett  YOB: 1985    Date of Creation: 1/17/2024  Date Treatment Plan Last Reviewed/Revised: 1/17/2024    DSM5 Diagnoses: 300.02 (F41.1) Generalized Anxiety Disorder  Psychosocial / Contextual Factors: , , heterosexual, male; employed    PROMIS (reviewed every 90 days): pt completed on 12/19/23    Referral / Collaboration:  Referral to another professional/service is not indicated at this time..    Anticipated number of session for this episode of care: 8  Anticipation frequency of session: Monthly  Anticipated Duration of each  "session: 38-52 minutes  Treatment plan will be reviewed in 90 days or when goals have been changed.       MeasurableTreatment Goal(s) related to diagnosis / functional impairment(s)  Goal 1: Patient will learn and apply at least 2 new coping strategies to reduce anxiety    I will know I've met my goal when : prevent the accumulation of stress and \"that point of dread\".       Objective #A (Patient Action)    Patient will  make time for himself daily .  Status: New - Date: 1/17/24      Intervention(s)  Therapist will teach the client how to complete a 4-part pros and cons. . .    Objective #B  Patient will  use deep breathing to manage stress/anxiety .  Status: New - Date: 1/17/24      Intervention(s)  Therapist will teach diaphragmatic breathing technique and exercise .      Objective #C  Patient will  use behavioral activation strategies .  Status: New - Date: 1/17/24      Intervention(s)  Therapist will teach the client how to perform a behavioral chain analysis.   .      Patient has reviewed and agreed to the above plan.      Shawn G. Ullrich, [facility], [facility]  January 17, 2024    "

## 2024-07-15 SDOH — HEALTH STABILITY: PHYSICAL HEALTH: ON AVERAGE, HOW MANY MINUTES DO YOU ENGAGE IN EXERCISE AT THIS LEVEL?: 30 MIN

## 2024-07-15 SDOH — HEALTH STABILITY: PHYSICAL HEALTH: ON AVERAGE, HOW MANY DAYS PER WEEK DO YOU ENGAGE IN MODERATE TO STRENUOUS EXERCISE (LIKE A BRISK WALK)?: 4 DAYS

## 2024-07-15 ASSESSMENT — SOCIAL DETERMINANTS OF HEALTH (SDOH): HOW OFTEN DO YOU GET TOGETHER WITH FRIENDS OR RELATIVES?: ONCE A WEEK

## 2024-07-16 ENCOUNTER — OFFICE VISIT (OUTPATIENT)
Dept: FAMILY MEDICINE | Facility: CLINIC | Age: 39
End: 2024-07-16
Payer: COMMERCIAL

## 2024-07-16 VITALS
OXYGEN SATURATION: 97 % | DIASTOLIC BLOOD PRESSURE: 88 MMHG | SYSTOLIC BLOOD PRESSURE: 145 MMHG | WEIGHT: 211.08 LBS | TEMPERATURE: 97.2 F | HEIGHT: 74 IN | HEART RATE: 91 BPM | BODY MASS INDEX: 27.09 KG/M2

## 2024-07-16 DIAGNOSIS — Z13.228 SCREENING FOR METABOLIC DISORDER: ICD-10-CM

## 2024-07-16 DIAGNOSIS — Z00.00 WELLNESS EXAMINATION: Primary | ICD-10-CM

## 2024-07-16 DIAGNOSIS — Z13.220 LIPID SCREENING: ICD-10-CM

## 2024-07-16 LAB
ANION GAP SERPL CALCULATED.3IONS-SCNC: 11 MMOL/L (ref 7–15)
BUN SERPL-MCNC: 13.8 MG/DL (ref 6–20)
CALCIUM SERPL-MCNC: 9.5 MG/DL (ref 8.8–10.4)
CHLORIDE SERPL-SCNC: 103 MMOL/L (ref 98–107)
CHOLEST SERPL-MCNC: 177 MG/DL
CREAT SERPL-MCNC: 1.17 MG/DL (ref 0.67–1.17)
EGFRCR SERPLBLD CKD-EPI 2021: 81 ML/MIN/1.73M2
FASTING STATUS PATIENT QL REPORTED: NO
FASTING STATUS PATIENT QL REPORTED: NO
GLUCOSE SERPL-MCNC: 93 MG/DL (ref 70–99)
HCO3 SERPL-SCNC: 26 MMOL/L (ref 22–29)
HDLC SERPL-MCNC: 49 MG/DL
LDLC SERPL CALC-MCNC: 103 MG/DL
NONHDLC SERPL-MCNC: 128 MG/DL
POTASSIUM SERPL-SCNC: 4 MMOL/L (ref 3.4–5.3)
SODIUM SERPL-SCNC: 140 MMOL/L (ref 135–145)
TRIGL SERPL-MCNC: 127 MG/DL

## 2024-07-16 RX ORDER — AMLODIPINE BESYLATE 5 MG/1
TABLET ORAL
Status: CANCELLED | OUTPATIENT
Start: 2024-07-16

## 2024-07-16 NOTE — PROGRESS NOTES
"Preventive Care Visit  [facility]  Bernabe Moore MD, Family Medicine  Jul 16, 2024      Assessment & Plan   Problem List Items Addressed This Visit    None  Visit Diagnoses       Wellness examination    -  Primary    Lipid screening        Relevant Orders    Lipid Profile    Screening for metabolic disorder        Relevant Orders    Basic metabolic panel           Suggested a referral to [facility] but also advised Orlando he would likely be seen much sooner at an outside clinic. Recommended he contact his insurance provider for a list of in-network dermatology clinics. Likely he will not need a referral for these providers but I am happy to place a referral if indicated.     Will follow up routine labs as indicated.     Patient has been advised of split billing requirements and indicates understanding: Yes       BMI  Estimated body mass index is 27.47 kg/m  as calculated from the following:    Height as of this encounter: 1.867 m (6' 1.5\").    Weight as of this encounter: 95.7 kg (211 lb 1.3 oz).       Counseling  Appropriate preventive services were discussed with this patient, including applicable screening as appropriate for fall prevention, nutrition, physical activity, Tobacco-use cessation, weight loss and cognition.  Checklist reviewing preventive services available has been given to the patient.  Reviewed patient's diet, addressing concerns and/or questions.     Bernabe Moore MD  10:31 AM, July 16, 2024        Subjective   Orlando is a 38 year old, presenting for the following:  Physical (Pt has a spot on his chest he would like to have looked at.)       HPI  # Health Maintenance  - BP:   BP Readings from Last 3 Encounters:   07/16/24 (!) 142/86   11/27/23 (!) 148/96   07/27/23 133/88   - Cholesterol: pending  No results for input(s): \"CHOL\", \"HDL\", \"LDL\", \"TRIG\" in the last 14375 hours.The ASCVD Risk score (Brandan KEYS, et al., 2019) failed to calculate for the following reasons:    The 2019 ASCVD risk " "score is only valid for ages 40 to 79  - Diabetes Screening: pending  - Lung Cancer Screening: not indicated  55-81yo w/30py smoking history and currently smoking OR quit within past 15 years:  Low dose CT annually and discontinued once a person has been 15 years tobacco free  - (+) seatbelt use, (+) helmet, (+) smoke detector  - Feels safe at home, denies verbal/physical/emotional abuse in past year: yes      Additional Concerns  Spot on Chest  - been there forever  - getting a little bigger he thinks  - a previous PCP suggested this was likely a benign \"vascular\" lesion  - it's starting to bother him and he would like meet with someone to talk about possibly having it removed.   - no pain, swelling or surrounding erythema        7/15/2024   General Health   How would you rate your overall physical health? Good   Feel stress (tense, anxious, or unable to sleep) Not at all            7/15/2024   Nutrition   Three or more servings of calcium each day? Yes   Diet: Regular (no restrictions)   How many servings of fruit and vegetables per day? (!) 2-3   How many sweetened beverages each day? 0-1            7/15/2024   Exercise   Days per week of moderate/strenous exercise 4 days   Average minutes spent exercising at this level 30 min            7/15/2024   Social Factors   Frequency of gathering with friends or relatives Once a week   Worry food won't last until get money to buy more No   Food not last or not have enough money for food? No   Do you have housing? (Housing is defined as stable permanent housing and does not include staying ouside in a car, in a tent, in an abandoned building, in an overnight shelter, or couch-surfing.) Yes   Are you worried about losing your housing? No   Lack of transportation? No   Unable to get utilities (heat,electricity)? No            7/15/2024   Dental   Dentist two times every year? Yes            7/15/2024   TB Screening   Were you born outside of the [place]? No            Today's " "PHQ-2 Score:       7/15/2024     9:10 PM   PHQ-2 ( 1999 [employer])   Q1: Little interest or pleasure in doing things 0   Q2: Feeling down, depressed or hopeless 0   PHQ-2 Score 0   Q1: Little interest or pleasure in doing things Not at all   Q2: Feeling down, depressed or hopeless Not at all   PHQ-2 Score 0           7/15/2024   Substance Use   Alcohol more than 3/day or more than 7/wk No   Do you use any other substances recreationally? No        Social History     Tobacco Use    Smoking status: Never    Smokeless tobacco: Never   Vaping Use    Vaping status: Never Used   Substance Use Topics    Alcohol use: Yes     Alcohol/week: 1.0 standard drink of alcohol     Types: 1 Standard drinks or equivalent per week           7/15/2024   One time HIV Screening   Previous HIV test? Yes          7/15/2024   STI Screening   New sexual partner(s) since last STI/HIV test? No            7/15/2024   Contraception/Family Planning   Questions about contraception or family planning No        Past Medical History:   Diagnosis Date    Primary hypertension 11/27/2023     Past Surgical History:   Procedure Laterality Date    VASECTOMY       Family History   Problem Relation Age of Onset    Leukemia Father            Review of Systems  Constitutional, HEENT, cardiovascular, pulmonary, gi and gu systems are negative, except as otherwise noted.     Objective    Exam  BP (!) 142/86   Pulse 91   Temp 97.2  F (36.2  C)   Ht 1.867 m (6' 1.5\")   Wt 95.7 kg (211 lb 1.3 oz)   SpO2 97%   BMI 27.47 kg/m     Estimated body mass index is 27.47 kg/m  as calculated from the following:    Height as of this encounter: 1.867 m (6' 1.5\").    Weight as of this encounter: 95.7 kg (211 lb 1.3 oz).    Physical Exam  GENERAL: alert and no distress  NECK: no adenopathy, no asymmetry, masses, or scars  RESP: lungs clear to auscultation - no rales, rhonchi or wheezes  CV: regular rate and rhythm, normal S1 S2, no S3 or S4, no murmur, click or rub, no " peripheral edema  ABDOMEN: soft, nontender, no hepatosplenomegaly, no masses and bowel sounds normal  MS: no gross musculoskeletal defects noted, no edema        Signed Electronically by: Bernabe Moore MD

## 2024-07-22 ENCOUNTER — LAB REQUISITION (OUTPATIENT)
Dept: LAB | Facility: CLINIC | Age: 39
End: 2024-07-22
Payer: COMMERCIAL

## 2024-07-22 DIAGNOSIS — D48.5 NEOPLASM OF UNCERTAIN BEHAVIOR OF SKIN: ICD-10-CM

## 2024-07-22 PROCEDURE — 88305 TISSUE EXAM BY PATHOLOGIST: CPT | Mod: 26 | Performed by: DERMATOLOGY

## 2024-07-22 PROCEDURE — 88305 TISSUE EXAM BY PATHOLOGIST: CPT | Mod: TC,ORL | Performed by: STUDENT IN AN ORGANIZED HEALTH CARE EDUCATION/TRAINING PROGRAM

## 2024-07-24 LAB
PATH REPORT.COMMENTS IMP SPEC: NORMAL
PATH REPORT.COMMENTS IMP SPEC: NORMAL
PATH REPORT.FINAL DX SPEC: NORMAL
PATH REPORT.GROSS SPEC: NORMAL
PATH REPORT.MICROSCOPIC SPEC OTHER STN: NORMAL
PATH REPORT.RELEVANT HX SPEC: NORMAL

## 2024-09-10 NOTE — PROGRESS NOTES
[facility]      PATIENT'S NAME: Ry Puckett  PREFERRED NAME: [place]  PRONOUNS:  he/him     MRN: [id number]  : 1985  ADDRESS: [facility] [address]  [facility] [id number] [place], [place] [address]T. NUMBER:  [id number]  DATE OF SERVICE: 24  PREFERRED PHONE: [phone]  May we leave a program related message: Yes  EMERGENCY CONTACT: was not obtained  .  SERVICE MODALITY:  In-person      Behavioral Health Clinician Progress Note    Patient Name: Ry Puckett           Service Type:  Individual      Service Location:   Face to Face in Clinic     Session Start Time: 10:02 am  Session End Time: 10:49 am      Session Length: 38 - 52      Attendees: Patient     Service Modality:  In-person    Visit Activities (Refresh list every visit): [facility] Only    Diagnostic Assessment Date: 23  Treatment Plan Review Date: 3/11/24  CGI Review Date: 24  Promis 10 Review Date: 24    Clinical Global Impressions  First:  Considering your total clinical experience with this particular patient population, how severe are the patient's symptoms at this time?: 4 (2024 11:09 AM)    Most recent:  Compared to the patient's condition at the START of treatment, this patient's condition is: 3 (2024 11:09 AM)        See Flowsheets for today's PHQ-9 and ADDY-7 results  Previous PHQ-9:       2024     9:28 AM 7/3/2024     7:35 AM 2024     9:55 AM   PHQ-9 SCORE   PHQ-9 Total Score MyChart 3 (Minimal depression) 1 (Minimal depression) 3 (Minimal depression)   PHQ-9 Total Score 3 1 3     Previous ADDY-7:       2024     9:30 AM 7/3/2024     7:35 AM 2024     9:55 AM   ADDY-7 SCORE   Total Score 4 (minimal anxiety) 1 (minimal anxiety) 3 (minimal anxiety)   Total Score 4 1 3       DATA  Extended Session (60+ minutes): No  Interactive Complexity: No  Crisis: No  [facility] Patient: No    Treatment Objective(s) Addressed in This Session:  Target Behavior(s):  exercise    Anxiety: will experience a  "reduction in anxiety, will develop more effective coping skills to manage anxiety symptoms, will develop healthy cognitive patterns and beliefs, and will increase ability to function adaptively    Current Stressors / Issues:  Orlando reported overall things have been good, but noted there were a couple times when things were \"rough\" when his parents visited.  Orlando reported times when having difficulty managing interactions with everyone (parents, wife, and kids), explaining its hard enough managing himself, while also trying to \"always say the right thing\".  Orlando shared example when he didn't always say the right thing.  C and Orlando reviewed communication and mindfulness skills that help him to better navigate these situations, including not judging himself (beating himself up only makes it worse), apologizing, and having open communication with mother about expectations for himself and others.   C and Orlando continued to discuss interpersonal, mindfulness, and communication skills and how they can help him to maintain effective communication with wife.  Lastly, C and Orlando reviewed strategies used to manage his own health, including reading more, increasing sleep, and remaining physically active.     Progress on Treatment Objective(s) / Homework:  Minimal progress - ACTION (Actively working towards change); Intervened by reinforcing change plan / affirming steps taken    Motivational Interviewing    MI Intervention: Co-Developed Goal: decrease anxiety, Expressed Empathy/Understanding, Supported Autonomy, Collaboration, Evocation, Open-ended questions, Reflections: simple and complex, and Change talk (evoked)     Change Talk Expressed by the Patient: Desire to change Ability to change Reasons to change Need to change Committment to change    Provider Response to Change Talk: E - Evoked more info from patient about behavior change, A - Affirmed patient's thoughts, decisions, or attempts at behavior change, R - Reflected " patient's change talk, and S - Summarized patient's change talk statements    Solution-Focused Therapy  Explore patterns in patient's behaviors and relationships and discuss options for new behaviors  Explore new options for problem-solving, communication, managing stress, etc.    Interpersonal Psychotherapy  Explore patterns in relationships that are effective or ineffective at helping patient reach their goals, find satisfying experience.  Discuss new patterns or behaviors to engage in for improved social functioning.    Mindfulness-Based Strategies  Discuss skills based on development and application of mindfulness  Support development of skills drawn from compassion-focused therapy, dialectical behavior therapy, mindfulness-based stress reduction, mindfulness-based cognitive therapy, etc.      Care Plan review completed: Yes    Medication Review:  No current psychiatric medications prescribed    Medication Compliance:  NA    Changes in Health Issues:   None reported    Chemical Use Review:   Substance Use: Chemical use reviewed, no active concerns identified      Tobacco Use: No current tobacco use.      Assessment: Current Emotional / Mental Status (status of significant symptoms):  Risk status (Self / Other harm or suicidal ideation)  Patient denies a history of suicidal ideation, suicide attempts, self-injurious behavior, homicidal ideation, homicidal behavior, and and other safety concerns  Patient denies current fears or concerns for personal safety.  Patient denies current or recent suicidal ideation or behaviors.  Patient denies current or recent homicidal ideation or behaviors.  Patient denies current or recent self injurious behavior or ideation.  Patient denies other safety concerns.  A safety and risk management plan has not been developed at this time, however patient was encouraged to call [facility] should there be a change in any of these risk factors.    Appearance:   Appropriate   Eye  Contact:   Good   Psychomotor Behavior: Normal   Attitude:   Cooperative   Orientation:   All  Speech   Rate / Production: Normal/ Responsive   Volume:  Normal   Mood:    Anxious ,  Affect:    Worrisome   Thought Content:  Clear   Thought Form:  Logical   Insight:    Good     Diagnoses:  1. ADDY (generalized anxiety disorder)        Collateral Reports Completed:  Routed note to PCP    Plan: (Homework, other):  Patient was given information about behavioral services and encouraged to schedule a follow up appointment with the clinic [facility] in 1 month.  He was also given information about mental health symptoms and treatment options  and deep Breathing Strategies to practice when experiencing anxiety.  CD Recommendations: No indications of CD issues.       Shawn G. Ullrich, [facility], [facility]    ______________________________________________________________________        Individual Treatment Plan    Patient's Name: Ry Puckett   YOB: 1985  Date of Creation: 9/11/24  Date Treatment Plan Last Reviewed/Revised: 9/11/24    DSM5 Diagnoses: 300.02 (F41.1) Generalized Anxiety Disorder  Psychosocial / Contextual Factors: Interpersonal Concerns and Parent/child dynamics  PROMIS (reviewed every 90 days):   The following assessments were completed by patient for this visit:  PROMIS 10-Global Health (only subscores and total score):       12/19/2023    11:38 AM 4/23/2024    10:08 PM 9/11/2024     9:56 AM   PROMIS-10 Scores Only   Global Mental Health Score 13 12 15   Global Physical Health Score 16 16 18   PROMIS TOTAL - SUBSCORES 29 28 33        Referral / Collaboration:  Referral to another professional/service is not indicated at this time..    Anticipated number of session for this episode of care: 6-9 sessions  Anticipation frequency of session: Monthly  Anticipated Duration of each session: 38-52 minutes  Treatment plan will be reviewed in 90 days or when goals have been changed.       MeasurableTreatment Goal(s)  "related to diagnosis / functional impairment(s)  Goal 1: Patient will learn and apply at least 2 new coping strategies to reduce anxiety    I will know I've met my goal when : prevent the accumulation of stress and \"that point of dread\".     Objective #A (Patient Action)     Patient will practice deep breathing at least 1x a day  Status: Continued - Date(s): 9/11/24    Intervention(s)  [facility] will teach diaphragmatic breathing technique, breathing exercises, and educate patient about how breathing impacts body and mind .      MeasurableTreatment Goal(s) related to diagnosis / functional impairment(s)  Goal 2: Patient will increase feeling that he and wife are working as a team   \"I will know I've met my goal when -\"We're a team when communicating well\".\"     Objective #A (Patient Action)    Patient will learn & utilize at least 2 assertive communication skills weekly  Status: New - Date: 9/11/24      Intervention(s)  [facility] will teach assertiveness skills.   .    Patient has reviewed and agreed to the above plan.    Written by  Shawn Ullrich LICSW, LADC          "

## 2024-09-11 ENCOUNTER — OFFICE VISIT (OUTPATIENT)
Dept: BEHAVIORAL HEALTH | Facility: CLINIC | Age: 39
End: 2024-09-11
Payer: COMMERCIAL

## 2024-09-11 DIAGNOSIS — F41.1 GAD (GENERALIZED ANXIETY DISORDER): Primary | ICD-10-CM

## 2024-09-11 ASSESSMENT — ANXIETY QUESTIONNAIRES
GAD7 TOTAL SCORE: 3
GAD7 TOTAL SCORE: 3
7. FEELING AFRAID AS IF SOMETHING AWFUL MIGHT HAPPEN: SEVERAL DAYS
8. IF YOU CHECKED OFF ANY PROBLEMS, HOW DIFFICULT HAVE THESE MADE IT FOR YOU TO DO YOUR WORK, TAKE CARE OF THINGS AT HOME, OR GET ALONG WITH OTHER PEOPLE?: SOMEWHAT DIFFICULT
GAD7 TOTAL SCORE: 3

## 2024-11-08 NOTE — PROGRESS NOTES
[facility]      PATIENT'S NAME: Ry Puckett  PREFERRED NAME: [place]  PRONOUNS:  he/him     MRN: [id number]  : 1985  ADDRESS: [facility] [address]  [facility] [id number] [place], [place] [address]T. NUMBER:  [id number]  DATE OF SERVICE: 24  PREFERRED PHONE: [phone]  May we leave a program related message: Yes  EMERGENCY CONTACT: was not obtained  .    Behavioral Health Clinician Progress Note    Patient Name: Ry Puckett           Service Type:  Individual      Service Location:   Face to Face in Clinic     Session Start Time: 9:35 am  Session End Time: 10:27 am      Session Length: 38 - 52      Attendees: Patient     Service Modality:  In-person    Visit Activities (Refresh list every visit): [facility] Only    Diagnostic Assessment Date: 23  Treatment Plan Review Date: 3/11/24  CGI Review Date: 24  Promis 10 Review Date: 24    Clinical Global Impressions  First:  Considering your total clinical experience with this particular patient population, how severe are the patient's symptoms at this time?: 4 (2024 11:09 AM)    Most recent:  Compared to the patient's condition at the START of treatment, this patient's condition is: 3 (2024 11:09 AM)        See Flowsheets for today's PHQ-9 and ADDY-7 results  Previous PHQ-9:       7/3/2024     7:35 AM 2024     9:55 AM 2024    10:27 PM   PHQ-9 SCORE   PHQ-9 Total Score MyChart 1 (Minimal depression) 3 (Minimal depression) 3 (Minimal depression)   PHQ-9 Total Score 1 3 3        Patient-reported     Previous ADDY-7:       7/3/2024     7:35 AM 2024     9:55 AM 2024    10:28 PM   ADDY-7 SCORE   Total Score 1 (minimal anxiety) 3 (minimal anxiety) 3 (minimal anxiety)   Total Score 1 3 3        Patient-reported       DATA  Extended Session (60+ minutes): No  Interactive Complexity: No  Crisis: No  [facility] Patient: No    Treatment Objective(s) Addressed in This Session:  Target Behavior(s):  exercise    Anxiety:  "will experience a reduction in anxiety, will develop more effective coping skills to manage anxiety symptoms, will develop healthy cognitive patterns and beliefs, and will increase ability to function adaptively    Current Stressors / Issues:  Orlando presented with stressed and anxious mood, reporting he's experienced multiple stressors, including parenting (\"We're in sleep chaos with the children\"), work (lay offs, 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  [facility] used active listening to provide normalize and validate current challenges and impacts, and then used Sol'n Focused Interventions discuss and identify current effective strategies, including parenting skills (patience, using wife for support, communication with wife, listening to children), acceptance, processing with and getting support from others (external processing), perspective taking, and focusing on what he can control.        Progress on Treatment Objective(s) / Homework:  No improvement - ACTION (Actively working towards change); Intervened by reinforcing change plan / affirming steps taken    Motivational Interviewing    MI Intervention: Co-Developed Goal: decrease anxiety, Expressed Empathy/Understanding, Supported Autonomy, Collaboration, Evocation, Open-ended questions, Reflections: simple and complex, and Change talk (evoked)     Change Talk Expressed by the Patient: Desire to change Ability to change Reasons to change Need to change Committment to change    Provider Response to Change Talk: E - Evoked more info from patient about behavior change, A - Affirmed patient's thoughts, decisions, or attempts at behavior change, R - Reflected patient's change talk, and S - Summarized patient's change talk statements    Solution-Focused Therapy  Explore patterns in patient's behaviors and relationships and discuss options for new " behaviors  Explore new options for problem-solving, communication, managing stress, etc.    Care Plan review completed: Yes    Medication Review:  No current psychiatric medications prescribed    Medication Compliance:  NA    Changes in Health Issues:   None reported    Chemical Use Review:   Substance Use: Chemical use reviewed, no active concerns identified      Tobacco Use: No current tobacco use.      Assessment: Current Emotional / Mental Status (status of significant symptoms):  Risk status (Self / Other harm or suicidal ideation)  Patient denies a history of suicidal ideation, suicide attempts, self-injurious behavior, homicidal ideation, homicidal behavior, and and other safety concerns  Patient denies current fears or concerns for personal safety.  Patient denies current or recent suicidal ideation or behaviors.  Patient denies current or recent homicidal ideation or behaviors.  Patient denies current or recent self injurious behavior or ideation.  Patient denies other safety concerns.  A safety and risk management plan has not been developed at this time, however patient was encouraged to call [place] [phone] should there be a change in any of these risk factors.    Appearance:   Appropriate   Eye Contact:   Good   Psychomotor Behavior: Normal   Attitude:   Cooperative   Orientation:   All  Speech   Rate / Production: Normal/ Responsive   Volume:  Normal   Mood:    Anxious ,  Affect:    Congruent with mood   Thought Content:  Clear   Thought Form:  Logical   Insight:    Good     Diagnoses:  1. ADDY (generalized anxiety disorder)          Collateral Reports Completed:  Routed note to PCP    Plan: (Homework, other):  Patient was given information about behavioral services and encouraged to schedule a follow up appointment with the clinic [facility] in 1 month.  He was also given information about mental health symptoms and treatment options  and deep Breathing Strategies to practice when experiencing anxiety.   "CD Recommendations: No indications of CD issues.       Shawn G. Ullrich, [facility], [facility]    ______________________________________________________________________        Individual Treatment Plan    Patient's Name: Ry Puckett   YOB: 1985  Date of Creation: 9/11/24  Date Treatment Plan Last Reviewed/Revised: 9/11/24    DSM5 Diagnoses: 300.02 (F41.1) Generalized Anxiety Disorder  Psychosocial / Contextual Factors: Interpersonal Concerns and Parent/child dynamics  PROMIS (reviewed every 90 days):   The following assessments were completed by patient for this visit:  PROMIS 10-Global Health (only subscores and total score):       12/19/2023    11:38 AM 4/23/2024    10:08 PM 9/11/2024     9:56 AM   PROMIS-10 Scores Only   Global Mental Health Score 13 12 15   Global Physical Health Score 16 16 18   PROMIS TOTAL - SUBSCORES 29 28 33        Referral / Collaboration:  Referral to another professional/service is not indicated at this time..    Anticipated number of session for this episode of care: 6-9 sessions  Anticipation frequency of session: Monthly  Anticipated Duration of each session: 38-52 minutes  Treatment plan will be reviewed in 90 days or when goals have been changed.       MeasurableTreatment Goal(s) related to diagnosis / functional impairment(s)  Goal 1: Patient will learn and apply at least 2 new coping strategies to reduce anxiety    I will know I've met my goal when : prevent the accumulation of stress and \"that point of dread\".     Objective #A (Patient Action)     Patient will practice deep breathing at least 1x a day  Status: Continued - Date(s): 9/11/24    Intervention(s)  [facility] will teach diaphragmatic breathing technique, breathing exercises, and educate patient about how breathing impacts body and mind .      MeasurableTreatment Goal(s) related to diagnosis / functional impairment(s)  Goal 2: Patient will increase feeling that he and wife are working as a team   \"I will know I've met " "my goal when -\"We're a team when communicating well\".\"     Objective #A (Patient Action)    Patient will learn & utilize at least 2 assertive communication skills weekly  Status: New - Date: 9/11/24      Intervention(s)  [facility] will teach assertiveness skills.   .    Patient has reviewed and agreed to the above plan.    Written by  Shawn Ullrich LICSW, MARIELLA          "

## 2024-11-11 ASSESSMENT — ANXIETY QUESTIONNAIRES
GAD7 TOTAL SCORE: 3
IF YOU CHECKED OFF ANY PROBLEMS ON THIS QUESTIONNAIRE, HOW DIFFICULT HAVE THESE PROBLEMS MADE IT FOR YOU TO DO YOUR WORK, TAKE CARE OF THINGS AT HOME, OR GET ALONG WITH OTHER PEOPLE: SOMEWHAT DIFFICULT
2. NOT BEING ABLE TO STOP OR CONTROL WORRYING: NOT AT ALL
GAD7 TOTAL SCORE: 3
4. TROUBLE RELAXING: NOT AT ALL
7. FEELING AFRAID AS IF SOMETHING AWFUL MIGHT HAPPEN: NOT AT ALL
GAD7 TOTAL SCORE: 3
3. WORRYING TOO MUCH ABOUT DIFFERENT THINGS: SEVERAL DAYS
7. FEELING AFRAID AS IF SOMETHING AWFUL MIGHT HAPPEN: NOT AT ALL
5. BEING SO RESTLESS THAT IT IS HARD TO SIT STILL: NOT AT ALL
1. FEELING NERVOUS, ANXIOUS, OR ON EDGE: SEVERAL DAYS
6. BECOMING EASILY ANNOYED OR IRRITABLE: SEVERAL DAYS
8. IF YOU CHECKED OFF ANY PROBLEMS, HOW DIFFICULT HAVE THESE MADE IT FOR YOU TO DO YOUR WORK, TAKE CARE OF THINGS AT HOME, OR GET ALONG WITH OTHER PEOPLE?: SOMEWHAT DIFFICULT

## 2024-11-11 ASSESSMENT — PATIENT HEALTH QUESTIONNAIRE - PHQ9
10. IF YOU CHECKED OFF ANY PROBLEMS, HOW DIFFICULT HAVE THESE PROBLEMS MADE IT FOR YOU TO DO YOUR WORK, TAKE CARE OF THINGS AT HOME, OR GET ALONG WITH OTHER PEOPLE: NOT DIFFICULT AT ALL
SUM OF ALL RESPONSES TO PHQ QUESTIONS 1-9: 3
SUM OF ALL RESPONSES TO PHQ QUESTIONS 1-9: 3

## 2024-11-12 ENCOUNTER — OFFICE VISIT (OUTPATIENT)
Dept: BEHAVIORAL HEALTH | Facility: CLINIC | Age: 39
End: 2024-11-12
Payer: COMMERCIAL

## 2024-11-12 DIAGNOSIS — F41.1 GAD (GENERALIZED ANXIETY DISORDER): Primary | ICD-10-CM

## 2025-01-19 NOTE — PROGRESS NOTES
[facility]      PATIENT'S NAME: Ry Puckett  PREFERRED NAME: [place]  PRONOUNS:  he/him     MRN: [id number]  : 1985  ADDRESS[address] [facility] [address]:  [id number]  DATE OF SERVICE: 25  PREFERRED PHONE: [phone]  May we leave a program related message: Yes  EMERGENCY CONTACT: was not obtained  .    Behavioral Health Clinician Progress Note    Patient Name: Ry Puckett           Service Type:  Individual      Service Location:   Face to Face in Clinic     Session Start Time: 10:11 am  Session End Time: 10:56 am      Session Length: 38 - 52      Attendees: Patient     Service Modality:  In-person    Visit Activities (Refresh list every visit): [facility] Only    Diagnostic Assessment Date: 23  Treatment Plan Review Date: 3/11/25  CGI Review Date: 3/18/25  Promis 10 Review Date: 3/18/25    Clinical Global Impressions  First:  Considering your total clinical experience with this particular patient population, how severe are the patient's symptoms at this time?: 4 (2024 11:30 AM)    Most recent:  Compared to the patient's condition at the START of treatment, this patient's condition is: 3 (2024 11:30 AM)        See Flowsheets for today's PHQ-9 and ADDY-7 results  Previous PHQ-9:       2024    10:27 PM 2024     9:45 AM 2025     9:20 AM   PHQ-9 SCORE   PHQ-9 Total Score MyChart 3 (Minimal depression) 1 (Minimal depression) 2 (Minimal depression)   PHQ-9 Total Score 3  1  2        Patient-reported     Previous ADDY-7:       2024    10:28 PM 2024     9:46 AM 2025     9:22 AM   ADDY-7 SCORE   Total Score 3 (minimal anxiety) 1 (minimal anxiety) 3 (minimal anxiety)   Total Score 3  1  3        Patient-reported       DATA  Extended Session (60+ minutes): No  Interactive Complexity: No  Crisis: No  [facility] Patient: No    Treatment Objective(s) Addressed in This Session:  Target Behavior(s):   "exercise    Anxiety: will experience a reduction in anxiety, will develop more effective coping skills to manage anxiety symptoms, will develop healthy cognitive patterns and beliefs, and will increase ability to function adaptively    Current Stressors / Issues:  Orlando presented with low and anxious mood, sharing work has become more challenging (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  Orlando explained his twins got RSV, then he, his wife, and parents all got RSV too.   Orlando reported he's still not gotten over the holidays, so [facility] assisted Orlando with processing the holidays by identifying thoughts, emotions, impact, and how to move forward.  Orlando stated it was \"in no way relaxing peaceful, relaxing, or satisfying\", explaining he was placed in the caretaker and  role for his wife, his parents, and children.   Orlando expressed disappointment and sadness, sharing his emotions and needs were ignored, while he was always having to consider everyone else's emotions, needs, opinions, etc.  Orlando expressed insight regarding his expectations regarding the holidays, including wanting to build traditions/memories and his obligation to his parents as an only child.  Orlando reported eventually all of this resulted in needing but not having space for himself. Orlando reported while he needs more time and space away from the holiday before he takes action to address these past and future holiday challenges, he knows he will need to have conversations with both his wife and parents, as well as adjust his own expectations for next year.       Progress on Treatment Objective(s) / Homework:  No improvement - CONTEMPLATION (Considering change and yet undecided); Intervened by " assessing the negative and positive thinking (ambivalence) about behavior change    Motivational Interviewing    MI Intervention: Co-Developed Goal: decrease anxiety, Expressed Empathy/Understanding, Supported Autonomy, Collaboration, Evocation, Open-ended questions, Reflections: simple and complex, and Change talk (evoked)     Change Talk Expressed by the Patient: Desire to change Ability to change Reasons to change Need to change Committment to change    Provider Response to Change Talk: E - Evoked more info from patient about behavior change, A - Affirmed patient's thoughts, decisions, or attempts at behavior change, R - Reflected patient's change talk, and S - Summarized patient's change talk statements    Psycho-education regarding mental health diagnoses and treatment options    Psychodynamic psychotherapy  Discuss patient's emotional dynamics and issues and how they impact behaviors  Explore patient's history of relationships and how they impact present behaviors  Explore how to work with and make changes in these schemas and patterns      Care Plan review completed: Yes    Medication Review:  No current psychiatric medications prescribed    Medication Compliance:  NA    Changes in Health Issues:   None reported    Chemical Use Review:   Substance Use: Chemical use reviewed, no active concerns identified      Tobacco Use: No current tobacco use.      Assessment: Current Emotional / Mental Status (status of significant symptoms):  Risk status (Self / Other harm or suicidal ideation)  Patient denies a history of suicidal ideation, suicide attempts, self-injurious behavior, homicidal ideation, homicidal behavior, and and other safety concerns  Patient denies current fears or concerns for personal safety.  Patient denies current or recent suicidal ideation or behaviors.  Patient denies current or recent homicidal ideation or behaviors.  Patient denies current or recent self injurious behavior or  ideation.  Patient denies other safety concerns.  A safety and risk management plan has not been developed at this time, however patient was encouraged to call [place] [phone] should there be a change in any of these risk factors.    Appearance:   Appropriate   Eye Contact:   Good   Psychomotor Behavior: Normal   Attitude:   Cooperative   Orientation:   All  Speech   Rate / Production: Normal/ Responsive   Volume:  Normal   Mood:    Anxious , depressed  Affect:    Subdued   Thought Content:  Clear   Thought Form:  Logical   Insight:    Good     Diagnoses:  1. ADDY (generalized anxiety disorder)        Collateral Reports Completed:  Routed note to PCP    Plan: (Homework, other):  Patient was given information about behavioral services and encouraged to schedule a follow up appointment with the clinic [facility] in 1 month.  He was also given information about mental health symptoms and treatment options  and deep Breathing Strategies to practice when experiencing anxiety.  CD Recommendations: No indications of CD issues.       Shawn G. Ullrich, [facility], [facility]    ______________________________________________________________________        Individual Treatment Plan    Patient's Name: Ry Puckett   YOB: 1985  Date of Creation: 9/11/24  Date Treatment Plan Last Reviewed/Revised: 9/11/24    DSM5 Diagnoses: 300.02 (F41.1) Generalized Anxiety Disorder  Psychosocial / Contextual Factors: Interpersonal Concerns and Parent/child dynamics    Considerations specific to safety concerns (eg suicidal, homicidal risks): NA     How are Patient's natural supports included in treatment:   pt declined to include supports in treatment planning     Coordination of Care:  coordinate care with PCP      Review and Revisions of the Treatment Plan (every 180 days):   Treatment plan reviewed with patient; Treatment plan remains current based on the patient's status and progress to date     PROMIS (reviewed every 90 days):   The  "following assessments were completed by patient for this visit:  PROMIS 10-Global Health (only subscores and total score):       12/19/2023    11:38 AM 4/23/2024    10:08 PM 9/11/2024     9:56 AM 12/18/2024     9:47 AM   PROMIS-10 Scores Only   Global Mental Health Score 13 12 15 15    Global Physical Health Score 16 16 18 18    PROMIS TOTAL - SUBSCORES 29 28 33 33        Patient-reported        Referral / Collaboration:  Referral to another professional/service is not indicated at this time..    Anticipated number of session for this episode of care: 6-9 sessions  Anticipation frequency of session: Monthly  Anticipated Duration of each session: 38-52 minutes  Treatment plan will be reviewed in 90 days or when goals have been changed.       MeasurableTreatment Goal(s) related to diagnosis / functional impairment(s)  Goal 1: Patient will learn and apply at least 2 new coping strategies to reduce anxiety    I will know I've met my goal when : prevent the accumulation of stress and \"that point of dread\".     Objective #A (Patient Action)     Patient will practice deep breathing at least 1x a day  Status: Continued - Date(s): 9/11/24    Intervention(s)  [facility] will teach diaphragmatic breathing technique, breathing exercises, and educate patient about how breathing impacts body and mind .      MeasurableTreatment Goal(s) related to diagnosis / functional impairment(s)  Goal 2: Patient will increase feeling that he and wife are working as a team   \"I will know I've met my goal when -\"We're a team when communicating well\".\"     Objective #A (Patient Action)    Patient will learn & utilize at least 2 assertive communication skills weekly  Status: New - Date: 9/11/24      Intervention(s)  [facility] will teach assertiveness skills.   .    Patient has reviewed and agreed to the above plan.    Written by  Shawn Ullrich [facility] HospitalMARIELLA CHEEK          "

## 2025-01-22 ENCOUNTER — OFFICE VISIT (OUTPATIENT)
Dept: BEHAVIORAL HEALTH | Facility: CLINIC | Age: 40
End: 2025-01-22
Payer: COMMERCIAL

## 2025-01-22 DIAGNOSIS — F41.1 GAD (GENERALIZED ANXIETY DISORDER): Primary | ICD-10-CM

## 2025-01-22 ASSESSMENT — ANXIETY QUESTIONNAIRES
1. FEELING NERVOUS, ANXIOUS, OR ON EDGE: SEVERAL DAYS
GAD7 TOTAL SCORE: 3
5. BEING SO RESTLESS THAT IT IS HARD TO SIT STILL: NOT AT ALL
4. TROUBLE RELAXING: NOT AT ALL
IF YOU CHECKED OFF ANY PROBLEMS ON THIS QUESTIONNAIRE, HOW DIFFICULT HAVE THESE PROBLEMS MADE IT FOR YOU TO DO YOUR WORK, TAKE CARE OF THINGS AT HOME, OR GET ALONG WITH OTHER PEOPLE: NOT DIFFICULT AT ALL
GAD7 TOTAL SCORE: 3
7. FEELING AFRAID AS IF SOMETHING AWFUL MIGHT HAPPEN: NOT AT ALL
2. NOT BEING ABLE TO STOP OR CONTROL WORRYING: NOT AT ALL
6. BECOMING EASILY ANNOYED OR IRRITABLE: SEVERAL DAYS
7. FEELING AFRAID AS IF SOMETHING AWFUL MIGHT HAPPEN: NOT AT ALL
GAD7 TOTAL SCORE: 3
3. WORRYING TOO MUCH ABOUT DIFFERENT THINGS: SEVERAL DAYS
8. IF YOU CHECKED OFF ANY PROBLEMS, HOW DIFFICULT HAVE THESE MADE IT FOR YOU TO DO YOUR WORK, TAKE CARE OF THINGS AT HOME, OR GET ALONG WITH OTHER PEOPLE?: NOT DIFFICULT AT ALL

## 2025-01-22 ASSESSMENT — PATIENT HEALTH QUESTIONNAIRE - PHQ9
SUM OF ALL RESPONSES TO PHQ QUESTIONS 1-9: 2
10. IF YOU CHECKED OFF ANY PROBLEMS, HOW DIFFICULT HAVE THESE PROBLEMS MADE IT FOR YOU TO DO YOUR WORK, TAKE CARE OF THINGS AT HOME, OR GET ALONG WITH OTHER PEOPLE: NOT DIFFICULT AT ALL
SUM OF ALL RESPONSES TO PHQ QUESTIONS 1-9: 2

## 2025-03-31 ASSESSMENT — ANXIETY QUESTIONNAIRES
IF YOU CHECKED OFF ANY PROBLEMS ON THIS QUESTIONNAIRE, HOW DIFFICULT HAVE THESE PROBLEMS MADE IT FOR YOU TO DO YOUR WORK, TAKE CARE OF THINGS AT HOME, OR GET ALONG WITH OTHER PEOPLE: SOMEWHAT DIFFICULT
GAD7 TOTAL SCORE: 3
GAD7 TOTAL SCORE: 3
2. NOT BEING ABLE TO STOP OR CONTROL WORRYING: NOT AT ALL
7. FEELING AFRAID AS IF SOMETHING AWFUL MIGHT HAPPEN: NOT AT ALL
4. TROUBLE RELAXING: NOT AT ALL
GAD7 TOTAL SCORE: 3
7. FEELING AFRAID AS IF SOMETHING AWFUL MIGHT HAPPEN: NOT AT ALL
5. BEING SO RESTLESS THAT IT IS HARD TO SIT STILL: NOT AT ALL
1. FEELING NERVOUS, ANXIOUS, OR ON EDGE: SEVERAL DAYS
8. IF YOU CHECKED OFF ANY PROBLEMS, HOW DIFFICULT HAVE THESE MADE IT FOR YOU TO DO YOUR WORK, TAKE CARE OF THINGS AT HOME, OR GET ALONG WITH OTHER PEOPLE?: SOMEWHAT DIFFICULT
6. BECOMING EASILY ANNOYED OR IRRITABLE: MORE THAN HALF THE DAYS
3. WORRYING TOO MUCH ABOUT DIFFERENT THINGS: NOT AT ALL

## 2025-03-31 NOTE — PROGRESS NOTES
[facility]      PATIENT'S NAME: Ry Puckett  PREFERRED NAME: [place]  PRONOUNS:  he/him     MRN: [id number]  : 1985  ADDRESS: [facility] [address]  [facility] [id number] [place], [place] [address]. NUMBER:  [id number]  DATE OF SERVICE: 25  PREFERRED PHONE: [phone]  May we leave a program related message: Yes  EMERGENCY CONTACT: was not obtained  .    Behavioral Health Clinician Progress Note    Patient Name: Ry Puckett           Service Type:  Individual      Service Location:   Face to Face in Clinic     Session Start Time: 9:06 am  Session End Time: 9:58 am      Session Length: 38 - 52      Attendees: Patient     Service Modality:  In-person    Visit Activities (Refresh list every visit): [facility] Only    Diagnostic Assessment Date: 23  Treatment Plan Review Date: 10/1/25  CGI Review Date: 25  Promis 10 Review Date: 25    Clinical Global Impressions  First:  Considering your total clinical experience with this particular patient population, how severe are the patient's symptoms at this time?: 4 (2025  2:08 PM)    Most recent:  Compared to the patient's condition at the START of treatment, this patient's condition is: 3 (2025  2:08 PM)        See Flowsheets for today's PHQ-9 and ADDY-7 results  Previous PHQ-9:       2024     9:45 AM 2025     9:20 AM 3/31/2025     8:26 PM   PHQ-9 SCORE   PHQ-9 Total Score MyChart 1 (Minimal depression) 2 (Minimal depression) 2 (Minimal depression)   PHQ-9 Total Score 1  2  2        Patient-reported     Previous ADDY-7:       2024     9:46 AM 2025     9:22 AM 3/31/2025     8:27 PM   ADDY-7 SCORE   Total Score 1 (minimal anxiety) 3 (minimal anxiety) 3 (minimal anxiety)   Total Score 1  3  3        Patient-reported       DATA  Extended Session (60+ minutes): No  Interactive Complexity: No  Crisis: No  [facility] Patient: No    Treatment Objective(s) Addressed in This Session:  Target Behavior(s):  exercise    Anxiety:  "will experience a reduction in anxiety, will develop more effective coping skills to manage anxiety symptoms, will develop healthy cognitive patterns and beliefs, and will increase ability to function adaptively    Current Stressors / Issues:  Orlando reported parenting and work continue to be stressful, and identified wanting to address parenting challenges during today's session.  [facility] used MI and Sol'n Focused interventions to identify and increase motivation to use effective coping/parenting strategies.  Orlando reported the following:    Children  -Twins are now sick with RSV  -Vishal is adapting to the twins being toddlers   -Vishal's sleep has improved since surgery    Parenting success since previous appt  -Orlando reached out to supports in parenting slack channel and got helpful suggestions (eg progressive distancing, book, etc)    Current Parenting Issues  -discipline  -working with wife  -\"take care of myself so I can show up for my kids\"  -\"not beat myself up\"    Challenges to effective parenting  -Anger response  -\"I'm not regulated\"  -biting cheeks  -\"accept that I have flaws\"    Worked well in the past  -tagging off with wife  -walking away  -Setting up the environment for success  -Asked Vishal what does he want/need  -Different mindset/perspective   -reminding himself that vishal's doing this not because he's disobedient, he's disoriented/dysregulated  -giving vishal his own time to do something    Warning signs  -thoughts--\"almost done\"--hurrying/forcing  -parenting the 3 of them   -resentment   -all alone    Strategies:  -talk to wife about preparing to parent all 3 at once   -If I know what the situation will be, I can manage it   -take a breath when its one on three      Progress on Treatment Objective(s) / Homework:  Minimal progress - ACTION (Actively working towards change); Intervened by reinforcing change plan / affirming steps taken    Motivational Interviewing    MI Intervention: Co-Developed Goal: decrease " anxiety, Expressed Empathy/Understanding, Supported Autonomy, Collaboration, Evocation, Open-ended questions, Reflections: simple and complex, and Change talk (evoked)     Change Talk Expressed by the Patient: Desire to change Ability to change Reasons to change Need to change Committment to change    Provider Response to Change Talk: E - Evoked more info from patient about behavior change, A - Affirmed patient's thoughts, decisions, or attempts at behavior change, R - Reflected patient's change talk, and S - Summarized patient's change talk statements      Skills training  Explore specific skills useful to client in current situation  Skill areas include assertiveness, communication, conflict management, problem-solving, relaxation, etc.     Solution-Focused Therapy  Explore patterns in patient's behaviors and relationships and discuss options for new behaviors  Explore new options for problem-solving, communication, managing stress, etc.    Care Plan review completed: Yes    Medication Review:  No current psychiatric medications prescribed    Medication Compliance:  NA    Changes in Health Issues:   None reported    Chemical Use Review:   Substance Use: Chemical use reviewed, no active concerns identified      Tobacco Use: No current tobacco use.      Assessment: Current Emotional / Mental Status (status of significant symptoms):  Risk status (Self / Other harm or suicidal ideation)  Patient denies a history of suicidal ideation, suicide attempts, self-injurious behavior, homicidal ideation, homicidal behavior, and and other safety concerns  Patient denies current fears or concerns for personal safety.  Patient denies current or recent suicidal ideation or behaviors.  Patient denies current or recent homicidal ideation or behaviors.  Patient denies current or recent self injurious behavior or ideation.  Patient denies other safety concerns.  A safety and risk management plan has not been developed at this time,  however patient was encouraged to call [place] [phone] should there be a change in any of these risk factors.    Appearance:   Appropriate   Eye Contact:   Good   Psychomotor Behavior: Normal   Attitude:   Cooperative   Orientation:   All  Speech   Rate / Production: Normal/ Responsive   Volume:  Normal   Mood:    Anxious ,  Affect:    Worrisome   Thought Content:  Clear   Thought Form:  Logical   Insight:    Good     Diagnoses:  1. ADDY (generalized anxiety disorder)          Collateral Reports Completed:  Routed note to PCP    Plan: (Homework, other):  Patient was given information about behavioral services and encouraged to schedule a follow up appointment with the clinic [facility]  6 weeks .  He was also given information about mental health symptoms and treatment options  and deep Breathing Strategies to practice when experiencing anxiety.  CD Recommendations: No indications of CD issues.       Shawn G. Ullrich, [facility], [facility]    ______________________________________________________________________        Individual Treatment Plan    Patient's Name: Ry Puckett   YOB: 1985  Date of Creation: 9/11/24  Date Treatment Plan Last Reviewed/Revised: 4/1/24    DSM5 Diagnoses: 300.02 (F41.1) Generalized Anxiety Disorder  Psychosocial / Contextual Factors: Interpersonal Concerns and Parent/child dynamics    Considerations specific to safety concerns (eg suicidal, homicidal risks): NA     How are Patient's natural supports included in treatment:   pt declined to include supports in treatment planning     Coordination of Care:  coordinate care with PCP      Review and Revisions of the Treatment Plan (every 180 days):   Treatment plan reviewed with patient; Treatment plan remains current based on the patient's status and progress to date     PROMIS (reviewed every 90 days):   The following assessments were completed by patient for this visit:  PROMIS 10-Global Health (only subscores and total score):        "12/19/2023    11:38 AM 4/23/2024    10:08 PM 9/11/2024     9:56 AM 12/18/2024     9:47 AM 3/31/2025     8:29 PM   PROMIS-10 Scores Only   Global Mental Health Score 13 12 15 15  13    Global Physical Health Score 16 16 18 18  17    PROMIS TOTAL - SUBSCORES 29 28 33 33  30        Patient-reported        Referral / Collaboration:  Referral to another professional/service is not indicated at this time..    Anticipated number of session for this episode of care: 6-9 sessions  Anticipation frequency of session: Monthly  Anticipated Duration of each session: 38-52 minutes  Treatment plan will be reviewed in 90 days or when goals have been changed.       MeasurableTreatment Goal(s) related to diagnosis / functional impairment(s)  Goal 1: Patient will learn and apply at least 2 new coping strategies to reduce anxiety    I will know I've met my goal when : prevent the accumulation of stress and \"that point of dread\"; decreased \"anger\"     Objective #A (Patient Action)     Patient will practice deep breathing at least 1x a day  And practice emotion regulation skill  Status: Continued - Date(s): 4/1/25    Intervention(s)  [facility] will teach diaphragmatic breathing technique, breathing exercises, and educate patient about how breathing impacts body and mind ; emotion regulation      MeasurableTreatment Goal(s) related to diagnosis / functional impairment(s)  Goal 2: Patient will increase feeling that he and wife are working as a team   \"I will know I've met my goal when -\"We're a team when communicating well\".\"     Objective #A (Patient Action)    Patient will learn & utilize at least 2 assertive communication skills weekly  Status: Continued - Date(s): 4/1/25     Intervention(s)  [facility] will teach communication and interpersonal skills .    Patient has reviewed and agreed to the above plan.    Written by  Shawn Ullrich LICSW, LADC          "

## 2025-04-01 ENCOUNTER — OFFICE VISIT (OUTPATIENT)
Dept: BEHAVIORAL HEALTH | Facility: CLINIC | Age: 40
End: 2025-04-01
Payer: COMMERCIAL

## 2025-04-01 DIAGNOSIS — F41.1 GAD (GENERALIZED ANXIETY DISORDER): Primary | ICD-10-CM

## 2025-05-06 NOTE — PROGRESS NOTES
[facility]      PATIENT'S NAME: Ry Puckett  PREFERRED NAME: [place]  PRONOUNS:  he/him     MRN: [id number]  : 1985  ADDRESS: [facility] [address]  [facility] [id number] [place], [place] [address]. NUMBER:  [id number]  DATE OF SERVICE: 25  PREFERRED PHONE: [phone]  May we leave a program related message: Yes  EMERGENCY CONTACT: was not obtained  .    Behavioral Health Clinician Progress Note    Patient Name: Ry Puckett           Service Type:  Individual      Service Location:   Face to Face in Clinic     Session Start Time: 10:09 am  Session End Time: 10:53 am      Session Length: 38 - 52      Attendees: Patient     Service Modality:  In-person    Visit Activities (Refresh list every visit): [facility] Only    Diagnostic Assessment Date: 23  Treatment Plan Review Date: 10/1/25  CGI Review Date: 25  Promis 10 Review Date: 25    Clinical Global Impressions  First:  Considering your total clinical experience with this particular patient population, how severe are the patient's symptoms at this time?: 4 (2025  2:08 PM)    Most recent:  Compared to the patient's condition at the START of treatment, this patient's condition is: 3 (2025  2:08 PM)        See Flowsheets for today's PHQ-9 and ADDY-7 results  Previous PHQ-9:       2025     9:20 AM 3/31/2025     8:26 PM 2025     9:43 AM   PHQ-9 SCORE   PHQ-9 Total Score MyChart 2 (Minimal depression) 2 (Minimal depression) 4 (Minimal depression)   PHQ-9 Total Score 2  2  4        Patient-reported     Previous ADDY-7:       2025     9:22 AM 3/31/2025     8:27 PM 2025     9:44 AM   ADDY-7 SCORE   Total Score 3 (minimal anxiety) 3 (minimal anxiety) 5 (mild anxiety)   Total Score 3  3  5        Patient-reported       DATA  Extended Session (60+ minutes): No  Interactive Complexity: No  Crisis: No  [facility] Patient: No    Treatment Objective(s) Addressed in This Session:  Target Behavior(s): exercise    Anxiety: will  "experience a reduction in anxiety, will develop more effective coping skills to manage anxiety symptoms, will develop healthy cognitive patterns and beliefs, and will increase ability to function adaptively    Current Stressors / Issues:  Orlando reported an parenting episode over the past weekend when he got overwhelmed.  While Orlando was able to get through the incident, he noted how he his perspective shifts from trying to be in and enjoy the moment to being exhausted and looking to when he can get relief (eg kids going to bed, wife coming back from trip, etc).   [facility] and [place] explored how spending time with is family is his greatest luan, but it can also be a significant stressor.  Orlando explained how his wife is able to get away (eg weekend trip to visit friends), but he doesn't have those times to get away, and how he would benefit from being able to get away.  [facility] used Sol'n Focused strategies to prompt identification of options for getting away and to list actionable steps he can take to make progress toward this goal.   Orlando stated he will explore weekly options for \"getting away\" (eg sailing lessons, soccer league/drop in games, other sports/activities) and reach out to long-time friends about planning future trip.     Ask next time  -did he reach out to friends about planning future trip?  -did he sign up for an activity (sailing lessons? Soccer? Etc).    Progress on Treatment Objective(s) / Homework:  No improvement - PREPARATION (Decided to change - considering how); Intervened by negotiating a change plan and determining options / strategies for behavior change, identifying triggers, exploring social supports, and working towards setting a date to begin behavior change    Motivational Interviewing    MI Intervention: Co-Developed Goal: decrease anxiety, Expressed Empathy/Understanding, Supported Autonomy, Collaboration, Evocation, Open-ended questions, Reflections: simple and complex, and Change talk (evoked) "     Change Talk Expressed by the Patient: Desire to change Ability to change Reasons to change Need to change Committment to change    Provider Response to Change Talk: E - Evoked more info from patient about behavior change, A - Affirmed patient's thoughts, decisions, or attempts at behavior change, R - Reflected patient's change talk, and S - Summarized patient's change talk statements    Solution-Focused Therapy  Explore patterns in patient's behaviors and relationships and discuss options for new behaviors  Explore new options for problem-solving, communication, managing stress, etc.    Care Plan review completed: Yes    Medication Review:  No current psychiatric medications prescribed    Medication Compliance:  NA    Changes in Health Issues:   None reported    Chemical Use Review:   Substance Use: Chemical use reviewed, no active concerns identified      Tobacco Use: No current tobacco use.      Assessment: Current Emotional / Mental Status (status of significant symptoms):  Risk status (Self / Other harm or suicidal ideation)  Patient denies a history of suicidal ideation, suicide attempts, self-injurious behavior, homicidal ideation, homicidal behavior, and and other safety concerns  Patient denies current fears or concerns for personal safety.  Patient denies current or recent suicidal ideation or behaviors.  Patient denies current or recent homicidal ideation or behaviors.  Patient denies current or recent self injurious behavior or ideation.  Patient denies other safety concerns.  A safety and risk management plan has not been developed at this time, however patient was encouraged to call [place] [phone] should there be a change in any of these risk factors.    Appearance:   Appropriate   Eye Contact:   Good   Psychomotor Behavior: Normal   Attitude:   Cooperative   Orientation:   All  Speech   Rate / Production: Normal/ Responsive   Volume:  Normal   Mood:    Anxious ,  Affect:    Congruent with mood    Thought Content:  Clear   Thought Form:  Logical   Insight:    Good     Diagnoses:  1. ADDY (generalized anxiety disorder)        Collateral Reports Completed:  Routed note to PCP    Plan: (Homework, other):  Patient was given information about behavioral services and encouraged to schedule a follow up appointment with the clinic [facility] 6 weeks.  He was also given information about mental health symptoms and treatment options  and deep Breathing Strategies to practice when experiencing anxiety.  CD Recommendations: No indications of CD issues.       Shawn G. Ullrich, [facility], [facility]    ______________________________________________________________________        Individual Treatment Plan    Patient's Name: Ry Puckett   YOB: 1985  Date of Creation: 9/11/24  Date Treatment Plan Last Reviewed/Revised: 4/1/24    DSM5 Diagnoses: 300.02 (F41.1) Generalized Anxiety Disorder  Psychosocial / Contextual Factors: Interpersonal Concerns and Parent/child dynamics    Considerations specific to safety concerns (eg suicidal, homicidal risks): NA     How are Patient's natural supports included in treatment:   pt declined to include supports in treatment planning     Coordination of Care:  coordinate care with PCP      Review and Revisions of the Treatment Plan (every 180 days):   Treatment plan reviewed with patient; Treatment plan remains current based on the patient's status and progress to date     PROMIS (reviewed every 90 days):   The following assessments were completed by patient for this visit:  PROMIS 10-Global Health (only subscores and total score):       12/19/2023    11:38 AM 4/23/2024    10:08 PM 9/11/2024     9:56 AM 12/18/2024     9:47 AM 3/31/2025     8:29 PM   PROMIS-10 Scores Only   Global Mental Health Score 13 12 15 15  13    Global Physical Health Score 16 16 18 18  17    PROMIS TOTAL - SUBSCORES 29 28 33 33  30        Patient-reported        Referral / Collaboration:  Referral to another  "professional/service is not indicated at this time..    Anticipated number of session for this episode of care: 6-9 sessions  Anticipation frequency of session: Monthly  Anticipated Duration of each session: 38-52 minutes  Treatment plan will be reviewed in 90 days or when goals have been changed.       MeasurableTreatment Goal(s) related to diagnosis / functional impairment(s)  Goal 1: Patient will learn and apply at least 2 new coping strategies to reduce anxiety    I will know I've met my goal when : prevent the accumulation of stress and \"that point of dread\"; decreased \"anger\"     Objective #A (Patient Action)     Patient will practice deep breathing at least 1x a day  And practice emotion regulation skill  Status: Continued - Date(s): 4/1/25    Intervention(s)  [facility] will teach diaphragmatic breathing technique, breathing exercises, and educate patient about how breathing impacts body and mind; emotion regulation      MeasurableTreatment Goal(s) related to diagnosis / functional impairment(s)  Goal 2: Patient will increase feeling that he and wife are working as a team   \"I will know I've met my goal when -\"We're a team when communicating well\".\"     Objective #A (Patient Action)    Patient will learn & utilize at least 2 assertive communication skills weekly  Status: Continued - Date(s): 4/1/25     Intervention(s)  [facility] will teach communication and interpersonal skills.    Patient has reviewed and agreed to the above plan.    Written by  Shawn Ullrich LICSW, LADC         " yes

## 2025-05-07 ENCOUNTER — OFFICE VISIT (OUTPATIENT)
Dept: BEHAVIORAL HEALTH | Facility: CLINIC | Age: 40
End: 2025-05-07
Payer: COMMERCIAL

## 2025-05-07 DIAGNOSIS — F41.1 GAD (GENERALIZED ANXIETY DISORDER): Primary | ICD-10-CM

## 2025-05-07 ASSESSMENT — ANXIETY QUESTIONNAIRES
1. FEELING NERVOUS, ANXIOUS, OR ON EDGE: SEVERAL DAYS
GAD7 TOTAL SCORE: 5
5. BEING SO RESTLESS THAT IT IS HARD TO SIT STILL: NOT AT ALL
IF YOU CHECKED OFF ANY PROBLEMS ON THIS QUESTIONNAIRE, HOW DIFFICULT HAVE THESE PROBLEMS MADE IT FOR YOU TO DO YOUR WORK, TAKE CARE OF THINGS AT HOME, OR GET ALONG WITH OTHER PEOPLE: SOMEWHAT DIFFICULT
3. WORRYING TOO MUCH ABOUT DIFFERENT THINGS: NOT AT ALL
8. IF YOU CHECKED OFF ANY PROBLEMS, HOW DIFFICULT HAVE THESE MADE IT FOR YOU TO DO YOUR WORK, TAKE CARE OF THINGS AT HOME, OR GET ALONG WITH OTHER PEOPLE?: SOMEWHAT DIFFICULT
4. TROUBLE RELAXING: SEVERAL DAYS
7. FEELING AFRAID AS IF SOMETHING AWFUL MIGHT HAPPEN: SEVERAL DAYS
GAD7 TOTAL SCORE: 5
GAD7 TOTAL SCORE: 5
7. FEELING AFRAID AS IF SOMETHING AWFUL MIGHT HAPPEN: SEVERAL DAYS
2. NOT BEING ABLE TO STOP OR CONTROL WORRYING: NOT AT ALL
6. BECOMING EASILY ANNOYED OR IRRITABLE: MORE THAN HALF THE DAYS

## 2025-05-07 ASSESSMENT — PATIENT HEALTH QUESTIONNAIRE - PHQ9
SUM OF ALL RESPONSES TO PHQ QUESTIONS 1-9: 4
SUM OF ALL RESPONSES TO PHQ QUESTIONS 1-9: 4
10. IF YOU CHECKED OFF ANY PROBLEMS, HOW DIFFICULT HAVE THESE PROBLEMS MADE IT FOR YOU TO DO YOUR WORK, TAKE CARE OF THINGS AT HOME, OR GET ALONG WITH OTHER PEOPLE: SOMEWHAT DIFFICULT

## 2025-06-17 NOTE — PROGRESS NOTES
[facility]      PATIENT'S NAME: Ry Puckett  PREFERRED NAME: [place]  PRONOUNS:  he/him     MRN: [id number]  : 1985  [address] [facility] [address] NUMBER:  [id number]  DATE OF SERVICE: 25  PREFERRED PHONE: [phone]  May we leave a program related message: Yes  EMERGENCY CONTACT: was not obtained  .    Behavioral Health Clinician Progress Note    Patient Name: Ry Puckett           Service Type:  Individual      Service Location:   Face to Face in Clinic     Session Start Time: 10:05 am  Session End Time: 10:58 am      Session Length: 53 - 60      Attendees: Patient     Service Modality:  In-person    Visit Activities (Refresh list every visit): [facility] Only    Diagnostic Assessment Date: 23  Treatment Plan Review Date: 10/1/25  CGI Review Date: 25  Promis 10 Review Date: 25    Clinical Global Impressions  First:  Considering your total clinical experience with this particular patient population, how severe are the patient's symptoms at this time?: 4 (2025  2:08 PM)    Most recent:  Compared to the patient's condition at the START of treatment, this patient's condition is: 3 (2025  2:08 PM)        See Flowsheets for today's PHQ-9 and ADDY-7 results  Previous PHQ-9:       3/31/2025     8:26 PM 2025     9:43 AM 2025     9:10 AM   PHQ-9 SCORE   PHQ-9 Total Score MyChart 2 (Minimal depression) 4 (Minimal depression) 4 (Minimal depression)   PHQ-9 Total Score 2  4  4        Patient-reported     Previous ADDY-7:       3/31/2025     8:27 PM 2025     9:44 AM 2025     9:12 AM   ADDY-7 SCORE   Total Score 3 (minimal anxiety) 5 (mild anxiety) 4 (minimal anxiety)   Total Score 3  5  4        Patient-reported       DATA  Extended Session (60+ minutes): No  Interactive Complexity: No  Crisis: No  [facility] Patient: No    Treatment Objective(s) Addressed in This Session:  Target Behavior(s): exercise    Anxiety: will  "experience a reduction in anxiety, will develop more effective coping skills to manage anxiety symptoms, will develop healthy cognitive patterns and beliefs, and will increase ability to function adaptively    Current Stressors / Issues:  [facility] and Orlando explored how physical health (sick, rebound symptoms), work (\"more omnipresent\", more reactionary, more monitoring of tasks completed), current events (political assassination) and parenting impact mood (\"hopeless\", \"angry\", overwhelmed, drained/fatigued, etc) and result in furthering of unhealthy behaviors, including lack of movement, more time on phone, etc.  [facility] then used Sol'n Focused interventions to prompt discussion of and identify healthy coping strategies.   Orlando shared how a short late night walk (9 pm, after the kids are down), being present, being grateful, and use of parenting skills are all helpful with managing anxiety, mood, and stress.   Orlando also shared how having something to look forward to is really helpful, citing possible Fall weekend trip with friends and family xmas vacation in [facility].  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     Lastly, [facility] informed Orlando of [facility]'s pending departure from current position at the [facility].  [facility] and Orlando processed this change by identifying the benefits of engaging in services and the impacts of ending the therapeutic relationship. [facility] then engaged Orlando in planning for transitioning/ending of services.   Orlando affirmed wanting to continue with psychotherapy, [facility] educated Orlando about options, including referral via [facility], exploring [facility] ([facility] demonstrated use), or [facility].  Orlando will consider options and scheduled follow up with [facility].       Progress on Treatment Objective(s) / Homework:  Minimal progress - ACTION (Actively working towards change); Intervened by reinforcing change plan / affirming steps " taken    Motivational Interviewing    MI Intervention: Co-Developed Goal: decrease anxiety, Expressed Empathy/Understanding, Supported Autonomy, Collaboration, Evocation, Open-ended questions, Reflections: simple and complex, and Change talk (evoked)     Change Talk Expressed by the Patient: Desire to change Ability to change Reasons to change Need to change Committment to change    Provider Response to Change Talk: E - Evoked more info from patient about behavior change, A - Affirmed patient's thoughts, decisions, or attempts at behavior change, R - Reflected patient's change talk, and S - Summarized patient's change talk statements    Solution-Focused Therapy  Explore patterns in patient's behaviors and relationships and discuss options for new behaviors  Explore new options for problem-solving, communication, managing stress, etc.    Care Plan review completed: Yes    Medication Review:  No current psychiatric medications prescribed    Medication Compliance:  NA    Changes in Health Issues:   None reported    Chemical Use Review:   Substance Use: Chemical use reviewed, no active concerns identified      Tobacco Use: No current tobacco use.      Assessment: Current Emotional / Mental Status (status of significant symptoms):  Risk status (Self / Other harm or suicidal ideation)  Patient denies a history of suicidal ideation, suicide attempts, self-injurious behavior, homicidal ideation, homicidal behavior, and and other safety concerns  Patient denies current fears or concerns for personal safety.  Patient denies current or recent suicidal ideation or behaviors.  Patient denies current or recent homicidal ideation or behaviors.  Patient denies current or recent self injurious behavior or ideation.  Patient denies other safety concerns.  A safety and risk management plan has not been developed at this time, however patient was encouraged to call [place] [phone] should there be a change in any of these risk  factors.    Appearance:   Appropriate   Eye Contact:   Good   Psychomotor Behavior: Normal   Attitude:   Cooperative   Orientation:   All  Speech   Rate / Production: Normal/ Responsive   Volume:  Normal   Mood:    Anxious ,  Affect:    Worrisome   Thought Content:  Clear   Thought Form:  Logical   Insight:    Good     Diagnoses:  1. ADDY (generalized anxiety disorder)          Collateral Reports Completed:  Routed note to PCP    Plan: (Homework, other):  Patient was given information about behavioral services and encouraged to schedule a follow up appointment with the clinic [facility] 5 weeks.  He was also given information about mental health symptoms and treatment options  and deep Breathing Strategies to practice when experiencing anxiety.  CD Recommendations: No indications of CD issues.       Shawn G. Ullrich, [facility], [facility]    ______________________________________________________________________        Individual Treatment Plan    Patient's Name: Ry Puckett   YOB: 1985  Date of Creation: 9/11/24  Date Treatment Plan Last Reviewed/Revised: 4/1/24    DSM5 Diagnoses: 300.02 (F41.1) Generalized Anxiety Disorder  Psychosocial / Contextual Factors: Interpersonal Concerns and Parent/child dynamics    Considerations specific to safety concerns (eg suicidal, homicidal risks): NA     How are Patient's natural supports included in treatment:   pt declined to include supports in treatment planning     Coordination of Care:  coordinate care with PCP      Review and Revisions of the Treatment Plan (every 180 days):   Treatment plan reviewed with patient; Treatment plan remains current based on the patient's status and progress to date     PROMIS (reviewed every 90 days):   The following assessments were completed by patient for this visit:  PROMIS 10-Global Health (only subscores and total score):       12/19/2023    11:38 AM 4/23/2024    10:08 PM 9/11/2024     9:56 AM 12/18/2024     9:47 AM 3/31/2025     8:29  "PM   PROMIS-10 Scores Only   Global Mental Health Score 13 12 15 15  13    Global Physical Health Score 16 16 18 18  17    PROMIS TOTAL - SUBSCORES 29 28 33 33  30        Patient-reported        Referral / Collaboration:  Referral to another professional/service is not indicated at this time..    Anticipated number of session for this episode of care: 6-9 sessions  Anticipation frequency of session: Monthly  Anticipated Duration of each session: 38-52 minutes  Treatment plan will be reviewed in 90 days or when goals have been changed.       MeasurableTreatment Goal(s) related to diagnosis / functional impairment(s)  Goal 1: Patient will learn and apply at least 2 new coping strategies to reduce anxiety    I will know I've met my goal when : prevent the accumulation of stress and \"that point of dread\"; decreased \"anger\"     Objective #A (Patient Action)     Patient will practice deep breathing at least 1x a day  And practice emotion regulation skill  Status: Continued - Date(s): 4/1/25    Intervention(s)  [facility] will teach diaphragmatic breathing technique, breathing exercises, and educate patient about how breathing impacts body and mind; emotion regulation      MeasurableTreatment Goal(s) related to diagnosis / functional impairment(s)  Goal 2: Patient will increase feeling that he and wife are working as a team   \"I will know I've met my goal when -\"We're a team when communicating well\".\"     Objective #A (Patient Action)    Patient will learn & utilize at least 2 assertive communication skills weekly  Status: Continued - Date(s): 4/1/25     Intervention(s)  [facility] will teach communication and interpersonal skills.    Patient has reviewed and agreed to the above plan.    Written by  Shawn Ullrich LICSW, LADC         "

## 2025-06-18 ENCOUNTER — OFFICE VISIT (OUTPATIENT)
Dept: BEHAVIORAL HEALTH | Facility: CLINIC | Age: 40
End: 2025-06-18
Payer: COMMERCIAL

## 2025-06-18 DIAGNOSIS — F41.1 GAD (GENERALIZED ANXIETY DISORDER): Primary | ICD-10-CM

## 2025-06-18 ASSESSMENT — PATIENT HEALTH QUESTIONNAIRE - PHQ9
SUM OF ALL RESPONSES TO PHQ QUESTIONS 1-9: 4
10. IF YOU CHECKED OFF ANY PROBLEMS, HOW DIFFICULT HAVE THESE PROBLEMS MADE IT FOR YOU TO DO YOUR WORK, TAKE CARE OF THINGS AT HOME, OR GET ALONG WITH OTHER PEOPLE: SOMEWHAT DIFFICULT
SUM OF ALL RESPONSES TO PHQ QUESTIONS 1-9: 4

## 2025-06-18 ASSESSMENT — ANXIETY QUESTIONNAIRES
4. TROUBLE RELAXING: SEVERAL DAYS
GAD7 TOTAL SCORE: 4
5. BEING SO RESTLESS THAT IT IS HARD TO SIT STILL: NOT AT ALL
IF YOU CHECKED OFF ANY PROBLEMS ON THIS QUESTIONNAIRE, HOW DIFFICULT HAVE THESE PROBLEMS MADE IT FOR YOU TO DO YOUR WORK, TAKE CARE OF THINGS AT HOME, OR GET ALONG WITH OTHER PEOPLE: SOMEWHAT DIFFICULT
6. BECOMING EASILY ANNOYED OR IRRITABLE: SEVERAL DAYS
7. FEELING AFRAID AS IF SOMETHING AWFUL MIGHT HAPPEN: SEVERAL DAYS
2. NOT BEING ABLE TO STOP OR CONTROL WORRYING: NOT AT ALL
GAD7 TOTAL SCORE: 4
GAD7 TOTAL SCORE: 4
1. FEELING NERVOUS, ANXIOUS, OR ON EDGE: NOT AT ALL
7. FEELING AFRAID AS IF SOMETHING AWFUL MIGHT HAPPEN: SEVERAL DAYS
3. WORRYING TOO MUCH ABOUT DIFFERENT THINGS: SEVERAL DAYS
8. IF YOU CHECKED OFF ANY PROBLEMS, HOW DIFFICULT HAVE THESE MADE IT FOR YOU TO DO YOUR WORK, TAKE CARE OF THINGS AT HOME, OR GET ALONG WITH OTHER PEOPLE?: SOMEWHAT DIFFICULT

## 2025-08-06 ENCOUNTER — OFFICE VISIT (OUTPATIENT)
Dept: BEHAVIORAL HEALTH | Facility: CLINIC | Age: 40
End: 2025-08-06
Payer: COMMERCIAL

## 2025-08-06 DIAGNOSIS — F41.1 GAD (GENERALIZED ANXIETY DISORDER): Primary | ICD-10-CM

## 2025-08-06 ASSESSMENT — ANXIETY QUESTIONNAIRES
7. FEELING AFRAID AS IF SOMETHING AWFUL MIGHT HAPPEN: NOT AT ALL
GAD7 TOTAL SCORE: 1
3. WORRYING TOO MUCH ABOUT DIFFERENT THINGS: NOT AT ALL
8. IF YOU CHECKED OFF ANY PROBLEMS, HOW DIFFICULT HAVE THESE MADE IT FOR YOU TO DO YOUR WORK, TAKE CARE OF THINGS AT HOME, OR GET ALONG WITH OTHER PEOPLE?: SOMEWHAT DIFFICULT
2. NOT BEING ABLE TO STOP OR CONTROL WORRYING: NOT AT ALL
6. BECOMING EASILY ANNOYED OR IRRITABLE: SEVERAL DAYS
7. FEELING AFRAID AS IF SOMETHING AWFUL MIGHT HAPPEN: NOT AT ALL
GAD7 TOTAL SCORE: 1
1. FEELING NERVOUS, ANXIOUS, OR ON EDGE: NOT AT ALL
5. BEING SO RESTLESS THAT IT IS HARD TO SIT STILL: NOT AT ALL
GAD7 TOTAL SCORE: 1
IF YOU CHECKED OFF ANY PROBLEMS ON THIS QUESTIONNAIRE, HOW DIFFICULT HAVE THESE PROBLEMS MADE IT FOR YOU TO DO YOUR WORK, TAKE CARE OF THINGS AT HOME, OR GET ALONG WITH OTHER PEOPLE: SOMEWHAT DIFFICULT
4. TROUBLE RELAXING: NOT AT ALL

## 2025-08-06 ASSESSMENT — PATIENT HEALTH QUESTIONNAIRE - PHQ9
10. IF YOU CHECKED OFF ANY PROBLEMS, HOW DIFFICULT HAVE THESE PROBLEMS MADE IT FOR YOU TO DO YOUR WORK, TAKE CARE OF THINGS AT HOME, OR GET ALONG WITH OTHER PEOPLE: NOT DIFFICULT AT ALL
SUM OF ALL RESPONSES TO PHQ QUESTIONS 1-9: 2
SUM OF ALL RESPONSES TO PHQ QUESTIONS 1-9: 2

## 2025-08-10 ENCOUNTER — OFFICE VISIT (OUTPATIENT)
Dept: URGENT CARE | Facility: URGENT CARE | Age: 40
End: 2025-08-10
Payer: COMMERCIAL

## 2025-08-10 VITALS
HEIGHT: 74 IN | OXYGEN SATURATION: 100 % | DIASTOLIC BLOOD PRESSURE: 91 MMHG | TEMPERATURE: 100.4 F | BODY MASS INDEX: 27.59 KG/M2 | SYSTOLIC BLOOD PRESSURE: 135 MMHG | RESPIRATION RATE: 18 BRPM | HEART RATE: 98 BPM | WEIGHT: 215 LBS

## 2025-08-10 DIAGNOSIS — J02.0 STREPTOCOCCAL PHARYNGITIS: ICD-10-CM

## 2025-08-10 DIAGNOSIS — R07.0 THROAT PAIN: Primary | ICD-10-CM

## 2025-08-10 LAB — DEPRECATED S PYO AG THROAT QL EIA: POSITIVE

## 2025-08-10 PROCEDURE — 87880 STREP A ASSAY W/OPTIC: CPT | Performed by: NURSE PRACTITIONER

## 2025-08-10 PROCEDURE — 3080F DIAST BP >= 90 MM HG: CPT | Performed by: NURSE PRACTITIONER

## 2025-08-10 PROCEDURE — 99213 OFFICE O/P EST LOW 20 MIN: CPT | Performed by: NURSE PRACTITIONER

## 2025-08-10 PROCEDURE — 3075F SYST BP GE 130 - 139MM HG: CPT | Performed by: NURSE PRACTITIONER

## 2025-08-10 RX ORDER — AMOXICILLIN 500 MG/1
500 CAPSULE ORAL 2 TIMES DAILY
Qty: 20 CAPSULE | Refills: 0 | Status: SHIPPED | OUTPATIENT
Start: 2025-08-10 | End: 2025-08-20

## 2025-08-31 ENCOUNTER — HEALTH MAINTENANCE LETTER (OUTPATIENT)
Age: 40
End: 2025-08-31